# Patient Record
Sex: FEMALE | Race: BLACK OR AFRICAN AMERICAN | Employment: FULL TIME | URBAN - METROPOLITAN AREA
[De-identification: names, ages, dates, MRNs, and addresses within clinical notes are randomized per-mention and may not be internally consistent; named-entity substitution may affect disease eponyms.]

---

## 2017-04-08 ENCOUNTER — HOSPITAL ENCOUNTER (EMERGENCY)
Age: 22
Discharge: HOME | End: 2017-04-08
Attending: EMERGENCY MEDICINE | Admitting: EMERGENCY MEDICINE
Payer: MEDICAID

## 2017-04-08 VITALS
BODY MASS INDEX: 30.22 KG/M2 | SYSTOLIC BLOOD PRESSURE: 113 MMHG | RESPIRATION RATE: 16 BRPM | OXYGEN SATURATION: 100 % | DIASTOLIC BLOOD PRESSURE: 74 MMHG | WEIGHT: 177 LBS | TEMPERATURE: 97.6 F | HEART RATE: 89 BPM | HEIGHT: 64 IN

## 2017-04-08 DIAGNOSIS — N30.00 ACUTE CYSTITIS WITHOUT HEMATURIA: ICD-10-CM

## 2017-04-08 DIAGNOSIS — R10.2 PELVIC PAIN AFFECTING PREGNANCY IN FIRST TRIMESTER, ANTEPARTUM: Primary | ICD-10-CM

## 2017-04-08 DIAGNOSIS — O26.891 PELVIC PAIN AFFECTING PREGNANCY IN FIRST TRIMESTER, ANTEPARTUM: Primary | ICD-10-CM

## 2017-04-08 LAB
APPEARANCE UR: ABNORMAL
BACTERIA URNS QL MICRO: ABNORMAL /HPF
BILIRUB UR QL: NEGATIVE
COLOR UR: YELLOW
EPITH CASTS URNS QL MICRO: ABNORMAL /LPF (ref 0–5)
GLUCOSE UR STRIP.AUTO-MCNC: NEGATIVE MG/DL
HGB UR QL STRIP: NEGATIVE
KETONES UR QL STRIP.AUTO: NEGATIVE MG/DL
LEUKOCYTE ESTERASE UR QL STRIP.AUTO: ABNORMAL
NITRITE UR QL STRIP.AUTO: NEGATIVE
PH UR STRIP: 6 [PH] (ref 5–8)
PROT UR STRIP-MCNC: NEGATIVE MG/DL
RBC #/AREA URNS HPF: ABNORMAL /HPF (ref 0–5)
SP GR UR REFRACTOMETRY: >1.03 (ref 1–1.03)
UROBILINOGEN UR QL STRIP.AUTO: 1 EU/DL (ref 0.2–1)
WBC URNS QL MICRO: ABNORMAL /HPF (ref 0–5)

## 2017-04-08 PROCEDURE — 99283 EMERGENCY DEPT VISIT LOW MDM: CPT

## 2017-04-08 PROCEDURE — 81001 URINALYSIS AUTO W/SCOPE: CPT | Performed by: EMERGENCY MEDICINE

## 2017-04-08 RX ORDER — CEPHALEXIN 500 MG/1
500 CAPSULE ORAL 2 TIMES DAILY
Qty: 14 CAP | Refills: 0 | Status: SHIPPED | OUTPATIENT
Start: 2017-04-08 | End: 2017-04-15

## 2017-04-08 NOTE — DISCHARGE INSTRUCTIONS
Urinary Tract Infection in Women: Care Instructions  Your Care Instructions    A urinary tract infection, or UTI, is a general term for an infection anywhere between the kidneys and the urethra (where urine comes out). Most UTIs are bladder infections. They often cause pain or burning when you urinate. UTIs are caused by bacteria and can be cured with antibiotics. Be sure to complete your treatment so that the infection goes away. Follow-up care is a key part of your treatment and safety. Be sure to make and go to all appointments, and call your doctor if you are having problems. It's also a good idea to know your test results and keep a list of the medicines you take. How can you care for yourself at home? · Take your antibiotics as directed. Do not stop taking them just because you feel better. You need to take the full course of antibiotics. · Drink extra water and other fluids for the next day or two. This may help wash out the bacteria that are causing the infection. (If you have kidney, heart, or liver disease and have to limit fluids, talk with your doctor before you increase your fluid intake.)  · Avoid drinks that are carbonated or have caffeine. They can irritate the bladder. · Urinate often. Try to empty your bladder each time. · To relieve pain, take a hot bath or lay a heating pad set on low over your lower belly or genital area. Never go to sleep with a heating pad in place. To prevent UTIs  · Drink plenty of water each day. This helps you urinate often, which clears bacteria from your system. (If you have kidney, heart, or liver disease and have to limit fluids, talk with your doctor before you increase your fluid intake.)  · Urinate when you need to. · Urinate right after you have sex. · Change sanitary pads often. · Avoid douches, bubble baths, feminine hygiene sprays, and other feminine hygiene products that have deodorants.   · After going to the bathroom, wipe from front to back.  When should you call for help? Call your doctor now or seek immediate medical care if:  · Symptoms such as fever, chills, nausea, or vomiting get worse or appear for the first time. · You have new pain in your back just below your rib cage. This is called flank pain. · There is new blood or pus in your urine. · You have any problems with your antibiotic medicine. Watch closely for changes in your health, and be sure to contact your doctor if:  · You are not getting better after taking an antibiotic for 2 days. · Your symptoms go away but then come back. Where can you learn more? Go to http://daquan-katia.info/. Enter M054 in the search box to learn more about \"Urinary Tract Infection in Women: Care Instructions. \"  Current as of: November 28, 2016  Content Version: 11.2  © 7338-0991 [employer]. Care instructions adapted under license by [employer] (which disclaims liability or warranty for this information). If you have questions about a medical condition or this instruction, always ask your healthcare professional. [place] [address] any warranty or liability for your use of this information. Learning About Pregnancy  Your Care Instructions  Your health in the early weeks of your pregnancy is particularly important for your babys health. Take good care of yourself. Anything you do that harms your body can also harm your baby. Make sure to go to all of your doctor appointments. Regular checkups will help keep you and your baby healthy. Follow-up care is a key part of your treatment and safety. Be sure to make and go to all appointments, and call your doctor if you are having problems. Its also a good idea to know your test results and keep a list of the medicines you take. How can you care for yourself at home? Diet  · Eat a balanced diet. Make sure your diet includes plenty of beans, peas, and leafy green vegetables.   · Do not skip meals or go for many hours without eating. If you are nauseated, try to eat a small, healthy snack every 2 to 3 hours. · Do not eat fish that has a high level of mercury, such as shark, swordfish, or mackerel. Do not eat more than one can of tuna each week. · Drink plenty of fluids, enough so that your urine is light yellow or clear like water. If you have kidney, heart, or liver disease and have to limit fluids, talk with your doctor before you increase the amount of fluids you drink. · Cut down on caffeine, such as coffee, tea, and cola. · Do not drink alcohol, such as beer, wine, or hard liquor. · Take a multivitamin that contains at least 400 micrograms (mcg) of folic acid to help prevent birth defects. Fortified cereal and whole wheat bread are good additional sources of folic acid. · Increase the calcium in your diet. Try to drink a quart of skim milk each day. You may also take calcium supplements and choose foods such as cheese and yogurt. Lifestyle  · Make sure you go to your follow-up appointments. · Get plenty of rest. You may be unusually tired while you are pregnant. · Get at least 30 minutes of exercise on most days of the week. Walking is a good choice. If you have not exercised in the past, start out slowly. Take several short walks each day. · Do not smoke. If you need help quitting, talk to your doctor about stop-smoking programs. These can increase your chances of quitting for good. · Do not touch cat feces or litter boxes. Also, wash your hands after you handle raw meat, and fully cook all meat before you eat it. Wear gloves when you work in the yard or garden, and wash your hands well when you are done. Cat feces, raw or undercooked meat, and contaminated dirt can cause an infection that may harm your baby or lead to a miscarriage. · Do not use saunas or hot tubs. Raising your body temperature may harm your baby. · Avoid chemical fumes, paint fumes, or poisons.   · Do not use illegal drugs or alcohol. Medicines  · Review all of your medicines with your doctor. Some of your routine medicines may need to be changed to protect your baby. · Use acetaminophen (Tylenol) to relieve minor problems, such as a mild headache or backache or a mild fever with cold symptoms. Do not use nonsteroidal anti-inflammatory drugs (NSAIDs), such as ibuprofen (Advil, Motrin) or naproxen (Aleve), unless your doctor says it is okay. · Do not take two or more pain medicines at the same time unless the doctor told you to. Many pain medicines have acetaminophen, which is Tylenol. Too much acetaminophen (Tylenol) can be harmful. · Take your medicines exactly as prescribed. Call your doctor if you think you are having a problem with your medicine. To manage morning sickness  · If you feel sick when you first wake up, try eating a small snack (such as crackers) before you get out of bed. Allow some time to digest the snack, and then get out of bed slowly. · Do not skip meals or go for long periods without eating. An empty stomach can make nausea worse. · Eat small, frequent meals instead of three large meals each day. · Drink plenty of fluids. Sports drinks, such as Gatorade or Powerade, are good choices. · Eat foods that are high in protein but low in fat. · If you are taking iron supplements, ask your doctor if they are necessary. Iron can make nausea worse. · Avoid any smells, such as coffee, that make you feel sick. · Get lots of rest. Morning sickness may be worse when you are tired. Where can you learn more? Go to http://daquan-katia.info/. Enter E074 in the search box to learn more about \"Learning About Pregnancy. \"  Current as of: May 30, 2016  Content Version: 11.2  © 0852-9101 [employer]. Care instructions adapted under license by [employer] (which disclaims liability or warranty for this information).  If you have questions about a medical condition or this instruction, always ask your healthcare professional. [place] [address] any warranty or liability for your use of this information. Pelvic Pain: Care Instructions  Your Care Instructions    Pelvic pain, or pain in the lower belly, can have many causes. Often pelvic pain is not serious and gets better in a few days. If your pain continues or gets worse, you may need tests and treatment. Tell your doctor about any new symptoms. These may be signs of a serious problem. Follow-up care is a key part of your treatment and safety. Be sure to make and go to all appointments, and call your doctor if you are having problems. It's also a good idea to know your test results and keep a list of the medicines you take. How can you care for yourself at home? · Rest until you feel better. Lie down, and raise your legs by placing a pillow under your knees. · Drink plenty of fluids. You may find that small, frequent sips are easier on your stomach than if you drink a lot at once. Avoid drinks with carbonation or caffeine, such as soda pop, tea, or coffee. · Try eating several small meals instead of 2 or 3 large ones. Eat mild foods, such as rice, dry toast or crackers, bananas, and applesauce. Avoid fatty and spicy foods, other fruits, and alcohol until 48 hours after your symptoms have gone away. · Take an over-the-counter pain medicine, such as acetaminophen (Tylenol), ibuprofen (Advil, Motrin), or naproxen (Aleve). Read and follow all instructions on the label. · Do not take two or more pain medicines at the same time unless the doctor told you to. Many pain medicines have acetaminophen, which is Tylenol. Too much acetaminophen (Tylenol) can be harmful. · You can put a heating pad, a warm cloth, or moist heat on your belly to relieve pain. When should you call for help? Call 911 anytime you think you may need emergency care. For example, call if:  · You passed out (lost consciousness).   Call your doctor now or seek immediate medical care if:  · Your pain is getting worse. · Your pain becomes focused in one area of your belly. · You have severe vaginal bleeding. Severe means that you are soaking through your usual pads or tampons every hour for 2 or more hours and passing clots of blood. · You have new symptoms such as fever, urinary problems or unexpected vaginal bleeding. · You are dizzy or lightheaded, or you feel like you may faint. Watch closely for changes in your health, and be sure to contact your doctor if:  · You do not get better as expected. Where can you learn more? Go to http://daquan-katia.info/. Enter [phone] in the search box to learn more about \"Pelvic Pain: Care Instructions. \"  Current as of: October 13, 2016  Content Version: 11.2  © 1767-5207 [employer]. Care instructions adapted under license by [employer] (which disclaims liability or warranty for this information). If you have questions about a medical condition or this instruction, always ask your healthcare professional. [address] any warranty or liability for your use of this information.

## 2017-04-08 NOTE — ED PROVIDER NOTES
HPI Comments:   9:27 AM    Lucrecia Danielson is a 24 y.o. A0 Female, approximately 10 weeks pregnant, presenting to the ED C/O lower abdominal pain, off and on x several weeks. Pt reports the pain is typically associated with passing gas. OB/GYN is Drea Gayle, first appointment with whom is 2017 (6 days from now). States she is here today mainly to ensure her Robbert Specking is okay. \" No complications with first pregnancy. Pt denies vaginal bleeding, vaginal discharge, dysuria, and any other Sx or complaints. Patient is a 24 y.o. female presenting with abdominal pain. The history is provided by the patient. No  was used. Abdominal Pain    This is a new problem. The current episode started more than 1 week ago. Pain location: lower. Pertinent negatives include no fever, no diarrhea, no nausea, no vomiting, no constipation, no dysuria, no frequency, no headaches, no chest pain and no back pain. No past medical history on file. No past surgical history on file. No family history on file. Social History     Social History    Marital status: SINGLE     Spouse name: N/A    Number of children: N/A    Years of education: N/A     Occupational History    Not on file. Social History Main Topics    Smoking status: Never Smoker    Smokeless tobacco: Never Used    Alcohol use No    Drug use: No    Sexual activity: Not on file     Other Topics Concern    Not on file     Social History Narrative         ALLERGIES: Review of patient's allergies indicates no known allergies. Review of Systems   Constitutional: Negative for chills and fever. HENT: Negative for congestion and sore throat. Respiratory: Negative for cough and shortness of breath. Cardiovascular: Negative for chest pain. Gastrointestinal: Positive for abdominal pain (lower). Negative for constipation, diarrhea, nausea and vomiting.    Genitourinary: Negative for difficulty urinating, dysuria, flank pain, frequency, urgency, vaginal bleeding and vaginal discharge. Musculoskeletal: Negative for back pain. Skin: Negative for rash and wound. Neurological: Negative for dizziness and headaches. Vitals:    04/08/17 0728 04/08/17 1027   BP: 123/76 113/74   Pulse: 99 89   Resp: 16 16   Temp: 97.6 °F (36.4 °C)    SpO2: 100% 100%   Weight: 80.3 kg (177 lb)    Height: 5' 4\" (1.626 m)             Physical Exam   Constitutional: She is oriented to person, place, and time. She appears well-developed and well-nourished. No distress. AA female in NAD. Alert. Appears comfortable. SO at bedside. HENT:   Head: Normocephalic and atraumatic. Right Ear: External ear normal.   Left Ear: External ear normal.   Eyes: Conjunctivae are normal.   Neck: Normal range of motion. Cardiovascular: Normal rate, regular rhythm, normal heart sounds and intact distal pulses. Exam reveals no gallop and no friction rub. No murmur heard. Pulmonary/Chest: Effort normal and breath sounds normal. No accessory muscle usage. No tachypnea. No respiratory distress. She has no decreased breath sounds. She has no wheezes. She has no rhonchi. She has no rales. Abdominal: Soft. Normal appearance and bowel sounds are normal. She exhibits no mass. There is no tenderness. There is no rigidity, no rebound, no guarding, no CVA tenderness, no tenderness at McBurney's point and negative Lawrence's sign. Musculoskeletal: Normal range of motion. Neurological: She is alert and oriented to person, place, and time. Skin: Skin is warm and dry. No rash noted. She is not diaphoretic. No erythema. Psychiatric: She has a normal mood and affect. Judgment normal.   Nursing note and vitals reviewed.      RESULTS:    No orders to display        Labs Reviewed   URINALYSIS W/ RFLX MICROSCOPIC - Abnormal; Notable for the following:        Result Value    Specific gravity >1.030 (*)     Leukocyte Esterase MODERATE (*)     All other components within normal limits   URINE MICROSCOPIC ONLY - Abnormal; Notable for the following:     Bacteria FEW (*)     All other components within normal limits       No results found for this or any previous visit (from the past 12 hour(s)). MDM  Number of Diagnoses or Management Options  Acute cystitis without hematuria:   Pelvic pain affecting pregnancy in first trimester, antepartum:   Diagnosis management comments: Ectopic, miscarriage, UTI       Amount and/or Complexity of Data Reviewed  Clinical lab tests: ordered and reviewed      ED Course     MEDICATIONS GIVEN:  Medications - No data to display     Bedside US  Date/Time: 4/9/2017 11:19 AM  Consent: Verbal consent obtained. Written consent not obtained. Consent given by: patient  Supervising provider: Dr. Justin De Santiago  Procedure Type: Pelvic ultrasound (transabdominal)  Indication: pregnancy  Findings: Positive IUP with fetal heart rate (144)  Fetal Heart Rate: 144 bpm  Confirmation Study: I have advised the patient to obtain a confirmatory study as an outpatient. (has appt in 6 days to see OB in office for [address] [facility] [facility])          PROGRESS NOTE:   9:27 AM  Initial assessment performed. Written by Ivette Bradley, ED Scribe, as dictated by Fanny Palumbo PA-C    Pt refusing formal US by tech or labs. States needs to leave to take mother to work. No vaginal bleeding. Benign abdomen. Performed bedside US for reassurance. IUP seen on bedside US with . No vaginal bleeding. Will tx for UTI. FU with OB as scheduled this week. Reasons to RTED discussed with pt. All questions answered. Pt feels comfortable going home at this time. Pt expressed understanding and she agrees with plan. DISCHARGE NOTE:  10:13 AM   Doreen Kapadia  results have been reviewed with her. She has been counseled regarding her diagnosis, treatment, and plan.   She verbally conveys understanding and agreement of the signs, symptoms, diagnosis, treatment and prognosis and additionally agrees to follow up as discussed. She also agrees with the care-plan and conveys that all of her questions have been answered. I have also provided discharge instructions for her that include: educational information regarding their diagnosis and treatment, and list of reasons why they would want to return to the ED prior to their follow-up appointment, should her condition change. The patient and/or family have been provided with education for proper Emergency Department utilization. CLINICAL IMPRESSION:    1. Pelvic pain affecting pregnancy in first trimester, antepartum    2. Acute cystitis without hematuria        AFTER VISIT PLAN:    Discharge Medication List as of 4/8/2017 10:15 AM      START taking these medications    Details   cephALEXin (KEFLEX) 500 mg capsule Take 1 Cap by mouth two (2) times a day for 7 days. Indications: E. COLI URINARY TRACT INFECTION, Print, Disp-14 Cap, R-0         CONTINUE these medications which have NOT CHANGED    Details   prenatal multivit-ca-min-fe-fa tab Take 1 Tab by mouth daily. Indications: PREGNANCY, Historical Med              Follow-up Information     Follow up With Details Comments Contact Info    Luis Enrique Egan MD  Follow up with your scheduled OB/GYN appointment next week [address]      [facility] EM[facility]  As needed, If symptoms worsen [address] [place] [id number]  [phone]             Attestations: This note is prepared by Radha Powers, acting as Scribe for Jethro Paget, PA-C. Jethro Paget, PA-C: The scribe's documentation has been prepared under my direction and personally reviewed by me in its entirety. I confirm that the note above accurately reflects all work, treatment, procedures, and medical decision making performed by me.

## 2017-04-08 NOTE — ED TRIAGE NOTES
C/o abd pain for a couple of weeks, denies pain at this time. States she is 10 weeks preg and has first OB appointment next week, states she just wants to make sure her baby is ok. Denies urinary sx's, vaginal bleeding or discharge.

## 2017-04-08 NOTE — ED NOTES
Care assumed for discharge only. Pt denies complaints on discharge, no distress noted, Patient armband removed and shredded. Pt given script x1 with discharge instructions and verbalizes understanding. Pt discharged home ambulatory with family.

## 2017-04-08 NOTE — ED NOTES
Pt reports \"I just am here to get my baby checked out, my first ob appointment isn't until Friday\"

## 2017-11-02 ENCOUNTER — HOSPITAL ENCOUNTER (INPATIENT)
Age: 22
LOS: 1 days | Discharge: HOME | DRG: 560 | End: 2017-11-04
Attending: OBSTETRICS & GYNECOLOGY | Admitting: OBSTETRICS & GYNECOLOGY
Payer: MEDICAID

## 2017-11-02 LAB
ABO + RH BLD: NORMAL
APPEARANCE UR: CLEAR
BASOPHILS # BLD: 0 K/UL (ref 0–0.06)
BASOPHILS NFR BLD: 0 % (ref 0–2)
BILIRUB UR QL: NEGATIVE
BLOOD GROUP ANTIBODIES SERPL: NORMAL
COLOR UR: YELLOW
DIFFERENTIAL METHOD BLD: ABNORMAL
EOSINOPHIL # BLD: 0 K/UL (ref 0–0.4)
EOSINOPHIL NFR BLD: 0 % (ref 0–5)
ERYTHROCYTE [DISTWIDTH] IN BLOOD BY AUTOMATED COUNT: 14 % (ref 11.6–14.5)
GLUCOSE UR QL STRIP.AUTO: NEGATIVE MG/DL
HCT VFR BLD AUTO: 34.2 % (ref 35–45)
HGB BLD-MCNC: 11.7 G/DL (ref 12–16)
KETONES UR-MCNC: ABNORMAL MG/DL
LEUKOCYTE ESTERASE UR QL STRIP: ABNORMAL
LYMPHOCYTES # BLD: 1 K/UL (ref 0.9–3.6)
LYMPHOCYTES NFR BLD: 7 % (ref 21–52)
MCH RBC QN AUTO: 29.9 PG (ref 24–34)
MCHC RBC AUTO-ENTMCNC: 34.2 G/DL (ref 31–37)
MCV RBC AUTO: 87.5 FL (ref 74–97)
MONOCYTES # BLD: 0.5 K/UL (ref 0.05–1.2)
MONOCYTES NFR BLD: 4 % (ref 3–10)
NEUTS SEG # BLD: 11.6 K/UL (ref 1.8–8)
NEUTS SEG NFR BLD: 89 % (ref 40–73)
NITRITE UR QL: NEGATIVE
PH UR: 7 [PH] (ref 5–9)
PLATELET # BLD AUTO: 201 K/UL (ref 135–420)
PMV BLD AUTO: 11.3 FL (ref 9.2–11.8)
PROT UR QL: NEGATIVE MG/DL
RBC # BLD AUTO: 3.91 M/UL (ref 4.2–5.3)
RBC # UR STRIP: ABNORMAL /UL
SERVICE CMNT-IMP: ABNORMAL
SP GR UR: 1.02 (ref 1–1.02)
SPECIMEN EXP DATE BLD: NORMAL
UROBILINOGEN UR QL: 0.2 EU/DL (ref 0.2–1)
WBC # BLD AUTO: 13 K/UL (ref 4.6–13.2)

## 2017-11-02 PROCEDURE — 85025 COMPLETE CBC W/AUTO DIFF WBC: CPT | Performed by: NURSE PRACTITIONER

## 2017-11-02 PROCEDURE — 75410000002 HC LABOR FEE PER 1 HR

## 2017-11-02 PROCEDURE — 36415 COLL VENOUS BLD VENIPUNCTURE: CPT | Performed by: NURSE PRACTITIONER

## 2017-11-02 PROCEDURE — 10907ZC DRAINAGE OF AMNIOTIC FLUID, THERAPEUTIC FROM PRODUCTS OF CONCEPTION, VIA NATURAL OR ARTIFICIAL OPENING: ICD-10-PCS | Performed by: OBSTETRICS & GYNECOLOGY

## 2017-11-02 PROCEDURE — 59025 FETAL NON-STRESS TEST: CPT

## 2017-11-02 PROCEDURE — 74011250636 HC RX REV CODE- 250/636

## 2017-11-02 PROCEDURE — 74011250637 HC RX REV CODE- 250/637: Performed by: NURSE PRACTITIONER

## 2017-11-02 PROCEDURE — 99282 EMERGENCY DEPT VISIT SF MDM: CPT

## 2017-11-02 PROCEDURE — 74011250636 HC RX REV CODE- 250/636: Performed by: NURSE PRACTITIONER

## 2017-11-02 PROCEDURE — 88307 TISSUE EXAM BY PATHOLOGIST: CPT | Performed by: OBSTETRICS & GYNECOLOGY

## 2017-11-02 PROCEDURE — 81003 URINALYSIS AUTO W/O SCOPE: CPT

## 2017-11-02 PROCEDURE — 74011000250 HC RX REV CODE- 250

## 2017-11-02 PROCEDURE — 75410000003 HC RECOV DEL/VAG/CSECN EA 0.5 HR

## 2017-11-02 PROCEDURE — 75410000000 HC DELIVERY VAGINAL/SINGLE

## 2017-11-02 PROCEDURE — 86900 BLOOD TYPING SEROLOGIC ABO: CPT | Performed by: NURSE PRACTITIONER

## 2017-11-02 RX ORDER — NALBUPHINE HYDROCHLORIDE 10 MG/ML
10 INJECTION, SOLUTION INTRAMUSCULAR; INTRAVENOUS; SUBCUTANEOUS
Status: DISCONTINUED | OUTPATIENT
Start: 2017-11-02 | End: 2017-11-03 | Stop reason: HOSPADM

## 2017-11-02 RX ORDER — TERBUTALINE SULFATE 1 MG/ML
0.25 INJECTION SUBCUTANEOUS
Status: DISCONTINUED | OUTPATIENT
Start: 2017-11-02 | End: 2017-11-03 | Stop reason: HOSPADM

## 2017-11-02 RX ORDER — FENTANYL/ROPIVACAINE/NS/PF 2MCG/ML-.1
PLASTIC BAG, INJECTION (ML) EPIDURAL
Status: DISPENSED
Start: 2017-11-02 | End: 2017-11-03

## 2017-11-02 RX ORDER — LIDOCAINE HYDROCHLORIDE 10 MG/ML
INJECTION INFILTRATION; PERINEURAL
Status: COMPLETED
Start: 2017-11-02 | End: 2017-11-02

## 2017-11-02 RX ORDER — HYDROMORPHONE HYDROCHLORIDE 2 MG/ML
1 INJECTION, SOLUTION INTRAMUSCULAR; INTRAVENOUS; SUBCUTANEOUS
Status: DISCONTINUED | OUTPATIENT
Start: 2017-11-02 | End: 2017-11-03 | Stop reason: HOSPADM

## 2017-11-02 RX ORDER — SODIUM CHLORIDE, SODIUM LACTATE, POTASSIUM CHLORIDE, CALCIUM CHLORIDE 600; 310; 30; 20 MG/100ML; MG/100ML; MG/100ML; MG/100ML
125 INJECTION, SOLUTION INTRAVENOUS CONTINUOUS
Status: DISCONTINUED | OUTPATIENT
Start: 2017-11-02 | End: 2017-11-03 | Stop reason: HOSPADM

## 2017-11-02 RX ORDER — METHYLERGONOVINE MALEATE 0.2 MG/ML
0.2 INJECTION INTRAVENOUS AS NEEDED
Status: COMPLETED | OUTPATIENT
Start: 2017-11-02 | End: 2017-11-02

## 2017-11-02 RX ORDER — LIDOCAINE HYDROCHLORIDE 10 MG/ML
20 INJECTION, SOLUTION EPIDURAL; INFILTRATION; INTRACAUDAL; PERINEURAL AS NEEDED
Status: DISCONTINUED | OUTPATIENT
Start: 2017-11-02 | End: 2017-11-03 | Stop reason: HOSPADM

## 2017-11-02 RX ORDER — OXYTOCIN/RINGER'S LACTATE 20/1000 ML
125 PLASTIC BAG, INJECTION (ML) INTRAVENOUS CONTINUOUS
Status: DISCONTINUED | OUTPATIENT
Start: 2017-11-02 | End: 2017-11-03 | Stop reason: HOSPADM

## 2017-11-02 RX ORDER — OXYTOCIN/RINGER'S LACTATE 20/1000 ML
500 PLASTIC BAG, INJECTION (ML) INTRAVENOUS ONCE
Status: DISCONTINUED | OUTPATIENT
Start: 2017-11-02 | End: 2017-11-03 | Stop reason: HOSPADM

## 2017-11-02 RX ORDER — BUTORPHANOL TARTRATE 2 MG/ML
2 INJECTION INTRAMUSCULAR; INTRAVENOUS
Status: DISCONTINUED | OUTPATIENT
Start: 2017-11-02 | End: 2017-11-03 | Stop reason: HOSPADM

## 2017-11-02 RX ORDER — FENTANYL CITRATE 50 UG/ML
INJECTION, SOLUTION INTRAMUSCULAR; INTRAVENOUS
Status: DISPENSED
Start: 2017-11-02 | End: 2017-11-03

## 2017-11-02 RX ORDER — MINERAL OIL
30 OIL (ML) ORAL AS NEEDED
Status: COMPLETED | OUTPATIENT
Start: 2017-11-02 | End: 2017-11-02

## 2017-11-02 RX ADMIN — LIDOCAINE HYDROCHLORIDE: 10 INJECTION, SOLUTION INFILTRATION; PERINEURAL at 19:40

## 2017-11-02 RX ADMIN — Medication 125 ML/HR: at 19:43

## 2017-11-02 RX ADMIN — METHYLERGONOVINE MALEATE 0.2 MG: 0.2 INJECTION, SOLUTION INTRAMUSCULAR; INTRAVENOUS at 20:19

## 2017-11-02 RX ADMIN — BUTORPHANOL TARTRATE 2 MG: 2 INJECTION, SOLUTION INTRAMUSCULAR; INTRAVENOUS at 17:30

## 2017-11-02 RX ADMIN — Medication 30 ML: at 19:35

## 2017-11-02 RX ADMIN — SODIUM CHLORIDE, SODIUM LACTATE, POTASSIUM CHLORIDE, AND CALCIUM CHLORIDE 125 ML/HR: 600; 310; 30; 20 INJECTION, SOLUTION INTRAVENOUS at 17:20

## 2017-11-02 NOTE — PROGRESS NOTES
1300  at 39 weeks comes to unit via wheelchair with complaints of occasional contractions pain at 6/10. States she lost her mucus plug around 1100 today. 1312 Pt placed on monitor. 1401 GUIDO Zuñiga paged. 1420 Assessment and urine dip results reviewed with Teja Yi CNM. Orders received to admit patient for delivery.

## 2017-11-02 NOTE — PROGRESS NOTES
-Bedside and verbal shift change report received from Mariza Champagne RN. Pt  at 39w0d gestation. Pt denies headache, SOB, epigastric pain, N/V, and visual disturbances. Pt reports pain 9/10 with contractions. Request epidural at this time. -[facility]ett for epidural placement    -Dr returned page, on his way to unit    -Pt states she feels urge to push. CNM in room, SVE 10/+2    -Pt starts pushing   -  -placenta delivered  -0.2mg IM methergine given as ordered per MAR    2136-Ambulated to restroom. Erma care, gown and pad changed. Pt returned to bed.  Call light in reach    45-Bedside and verbal report given to MOLLY CHRISTIANSON.

## 2017-11-02 NOTE — H&P
History & Physical    Name: Joby Jeffers MRN: [id number]  SSN: [id number]    YOB: 1995  Age: 20 y.o. Sex: female        Subjective:     Estimated Date of Delivery: 17  OB History      Para Term  AB Living    2 1 1   1    SAB TAB Ectopic Molar Multiple Live Births        0 1            MsMartell Alfaro is admitted with pregnancy at 39w0d for active labor. Prenatal course was complicated by Pos chlamydia and oral HSV. Please see prenatal records for details. No Known Allergies    Objective:     Vitals:  Vitals:    17 1315 17 1318 17 1320   BP: 134/81  134/81   Pulse: (!) 115  (!) 115   Resp: 16  16   Temp: 97.8 °F (36.6 °C)  97.8 °F (36.6 °C)   Weight:  86.6 kg (191 lb)    Height:  5' 4\" (1.626 m)         Physical Exam:  Patient without distress. Heart: Regular rate and rhythm  Lung: clear to auscultation throughout lung fields, no wheezes, no rales, no rhonchi and normal respiratory effort  Abdomen: soft, nontender  Fundus: soft and non tender  Perineum: blood absent, amniotic fluid absent  Cervical Exam: 6 cm dilated    90% effaced    -1 station    Lower Extremities: no evidence of DVT  Membranes:  Intact  Fetal Heart Rate & Contraction pattern: Reactive    Prenatal Labs:   Lab Results   Component Value Date/Time    Rubella, External Immune 10/23/2015    GrBStrep, External Negative 2015    HBsAg, External Negative 10/23/2015    HIV, External Negative 10/23/2015    RPR, External Non reactive 10/23/2015    Gonorrhea, External Negative 10/23/2015    Chlamydia, External Negative 10/23/2015         Assessment/Plan:     Plan: Admit for Reassuring fetal status, Continue plan for vaginal delivery. Group B Strep was negative.     Signed By:  Jason Covarrubias CNM     2017

## 2017-11-03 LAB
HCT VFR BLD AUTO: 33.6 % (ref 35–45)
HGB BLD-MCNC: 11.5 G/DL (ref 12–16)

## 2017-11-03 PROCEDURE — 65270000029 HC RM PRIVATE

## 2017-11-03 PROCEDURE — 74011250637 HC RX REV CODE- 250/637: Performed by: ADVANCED PRACTICE MIDWIFE

## 2017-11-03 PROCEDURE — 85014 HEMATOCRIT: CPT | Performed by: OBSTETRICS & GYNECOLOGY

## 2017-11-03 PROCEDURE — 85018 HEMOGLOBIN: CPT | Performed by: OBSTETRICS & GYNECOLOGY

## 2017-11-03 PROCEDURE — 36415 COLL VENOUS BLD VENIPUNCTURE: CPT | Performed by: OBSTETRICS & GYNECOLOGY

## 2017-11-03 RX ORDER — PROMETHAZINE HYDROCHLORIDE 25 MG/ML
25 INJECTION, SOLUTION INTRAMUSCULAR; INTRAVENOUS
Status: DISCONTINUED | OUTPATIENT
Start: 2017-11-03 | End: 2017-11-04 | Stop reason: HOSPADM

## 2017-11-03 RX ORDER — OXYCODONE AND ACETAMINOPHEN 5; 325 MG/1; MG/1
2 TABLET ORAL
Status: DISCONTINUED | OUTPATIENT
Start: 2017-11-03 | End: 2017-11-04 | Stop reason: HOSPADM

## 2017-11-03 RX ORDER — ZOLPIDEM TARTRATE 5 MG/1
5 TABLET ORAL
Status: DISCONTINUED | OUTPATIENT
Start: 2017-11-03 | End: 2017-11-04 | Stop reason: HOSPADM

## 2017-11-03 RX ORDER — ACETAMINOPHEN 325 MG/1
650 TABLET ORAL
Status: DISCONTINUED | OUTPATIENT
Start: 2017-11-03 | End: 2017-11-04 | Stop reason: HOSPADM

## 2017-11-03 RX ORDER — IBUPROFEN 800 MG/1
800 TABLET ORAL
Qty: 50 TAB | Refills: 0 | Status: SHIPPED | OUTPATIENT
Start: 2017-11-03

## 2017-11-03 RX ORDER — IBUPROFEN 400 MG/1
800 TABLET ORAL
Status: DISCONTINUED | OUTPATIENT
Start: 2017-11-03 | End: 2017-11-04 | Stop reason: HOSPADM

## 2017-11-03 RX ORDER — AMOXICILLIN 250 MG
1 CAPSULE ORAL
Status: DISCONTINUED | OUTPATIENT
Start: 2017-11-03 | End: 2017-11-04 | Stop reason: HOSPADM

## 2017-11-03 RX ADMIN — IBUPROFEN 800 MG: 400 TABLET, FILM COATED ORAL at 14:47

## 2017-11-03 NOTE — LACTATION NOTE
Mom just gave another bottle. Mom encouraged to decide what she really wants to be done and reiterated supply/demand and nipple preference.

## 2017-11-03 NOTE — PROGRESS NOTES
Bedside and Verbal shift change report given to SERENITY Hilliard RN (oncoming nurse) by SERENITY William RN (offgoing nurse). Report included the following information SBAR, Kardex, Intake/Output, MAR and Recent Results.

## 2017-11-03 NOTE — PROGRESS NOTES
INITIAL NUTRITION ASSESSMENT     REASON FOR ASSESSMENT:   [x]  RN Referral:    [x] MST score >/=2  Malnutrition Screening Tool (MST):  Recently Lost Weight Without Trying: No  If Yes, How Much Weight Loss: Unsure  Eating Poorly Due to Decreased Appetite: No  MST Score: 2    [] Enteral/Parenteral Nutrition PTA   [] Pregnant (Not in Labor):  [] Gestational DM     [x] Multigestation   [] Pressure Ulcer/Wound Care needs    NUTRITION ASSESSMENT:   Client History: 20 yrs old Female admitted with single pregnancy, s/p birth of infant. Nutrition best practice alert triggered in error. Please consult if dietitian services are indicated and desired.  Thank you.    [] Participated in Interdisciplinary Rounds  [x] [facility] Reviewed/Documented  [] Discharge Nutrition Plan     Baldev Mccarty RD  PAGER:  [phone]

## 2017-11-03 NOTE — PROGRESS NOTES
Bedside and Verbal shift change report given to SERENITY William RN (oncoming nurse) by SERENITY Curran RN (offgoing nurse). Report included the following information SBAR, Kardex and Recent Results.

## 2017-11-03 NOTE — PROGRESS NOTES
Progress Note    Patient: Anshu Saravia MRN: [id number]     YOB: 1995  Age: 20 y.o. Subjective:     Postpartum Day: 1    The patient is feeling well. Pain is  well controlled with current medications. Baby is feeding via breast without difficulty. Urinary output is adequate. Objective:      Patient Vitals for the past 8 hrs:   BP Temp Pulse Resp SpO2   11/03/17 0636 114/68 98.9 °F (37.2 °C) 91 18 96 %     General:    alert, cooperative, no distress   Lochia:  appropriate   Uterine Fundus:   firm @ 2 fingerbreaths below umbilicus    Perineum:  Intact    DVT Evaluation:  No evidence of DVT seen on physical exam.     Lab/Data Review:  Recent Results (from the past 24 hour(s))   POC URINE MACROSCOPIC    Collection Time: 11/02/17  1:42 PM   Result Value Ref Range    Color YELLOW      Appearance CLEAR      Spec. gravity (POC) 1.025 (H) 1.001 - 1.023      pH, urine  (POC) 7.0 5.0 - 9.0      Protein (POC) NEGATIVE  NEG mg/dL    Glucose, urine (POC) NEGATIVE  NEG mg/dL    Ketones (POC) TRACE (A) NEG mg/dL    Bilirubin (POC) NEGATIVE  NEG      Blood (POC) Trace Intact (A) NEG      Urobilinogen (POC) 0.2 0.2 - 1.0 EU/dL    Nitrite (POC) NEGATIVE  NEG      Leukocyte esterase (POC) TRACE (A) NEG      Performed by Lilliana Singh    CBC WITH AUTOMATED DIFF    Collection Time: 11/02/17  5:40 PM   Result Value Ref Range    WBC 13.0 4.6 - 13.2 K/uL    RBC 3.91 (L) 4.20 - 5.30 M/uL    HGB 11.7 (L) 12.0 - 16.0 g/dL    HCT 34.2 (L) 35.0 - 45.0 %    MCV 87.5 74.0 - 97.0 FL    MCH 29.9 24.0 - 34.0 PG    MCHC 34.2 31.0 - 37.0 g/dL    RDW 14.0 11.6 - 14.5 %    PLATELET 607 513 - 131 K/uL    MPV 11.3 9.2 - 11.8 FL    NEUTROPHILS 89 (H) 40 - 73 %    LYMPHOCYTES 7 (L) 21 - 52 %    MONOCYTES 4 3 - 10 %    EOSINOPHILS 0 0 - 5 %    BASOPHILS 0 0 - 2 %    ABS. NEUTROPHILS 11.6 (H) 1.8 - 8.0 K/UL    ABS. LYMPHOCYTES 1.0 0.9 - 3.6 K/UL    ABS. MONOCYTES 0.5 0.05 - 1.2 K/UL    ABS. EOSINOPHILS 0.0 0.0 - 0.4 K/UL    ABS. BASOPHILS 0.0 0.0 - 0.06 K/UL    DF AUTOMATED     TYPE & SCREEN    Collection Time: 17  5:40 PM   Result Value Ref Range    Crossmatch Expiration 2017     ABO/Rh(D) A POSITIVE     Antibody screen NEG    HEMATOCRIT    Collection Time: 17  2:59 AM   Result Value Ref Range    HCT 33.6 (L) 35.0 - 45.0 %   HEMOGLOBIN    Collection Time: 17  2:59 AM   Result Value Ref Range    HGB 11.5 (L) 12.0 - 16.0 g/dL     All lab results for the last 24 hours reviewed. Assessment:     Delivery:     Plan:     Doing well postpartum vaginal delivery. Continue current postpartum care. Encouraged hydration, nutrition and ambulation. DC home tomorrow. Follow-up in office in 6 weeks. Call prn. Current Discharge Medication List      CONTINUE these medications which have NOT CHANGED    Details   prenatal multivit-ca-min-fe-fa tab Take 1 Tab by mouth daily.  Indications: PREGNANCY             Signed By: Tera Benitez CNM     November 3, 2017

## 2017-11-03 NOTE — L&D DELIVERY NOTE
Delivery Procedure Note     Delivery Physician:  Nisa Mitchell CNM    Procedure: Spontaneous vaginal delivery    Monitor:  Fetal Heart Tones - External and Uterine Contractions - External    Indications for instrumental delivery: none    Estimated Blood Loss: 100 ml    Episiotomy: n/a    Laceration(s):  1st degree vaginal    Laceration(s) repair: NO    Presentation: Cephalic    Fetal Description: marte    Fetal Position: Left Occiput Anterior    [place] Interventions: Nasopharyngeal/Oropharyngeal Suctioning, Warming/Drying and Tactile stimulation    Birth Weight: 3711gm    Birth Length: 48.3 cm    Apgar - One Minute: 9    Apgar - Five Minutes: 9    Umbilical Cord: 3 vessels present    Placenta:  Date 17 Time:     Placenta Removal: spontaneous    Placenta Appearance: normal intact    Specimens: Cord blood for DNA           Complications:  none           Midwife called into room to assess patient. Pt pushing with good maternal effort. Spontaneous vaginal delivery of viable infant female delivered without difficulty. Infant with spontaneous cry,  tactile stimulation and bulb oral suction given.  placed on maternal abdomen for skin to skin contact. Delayed cord x 2 minutes. Cord clamped x 2 by POLINA and cut by FOB. Infant to nursery nurse, refer to nursery notes for NB assessment. Placenta delivered spontaneously,Radha,3VC with central insertion and hematomas noted near cord insertion site. Placenta sent to pathology. Pitocin IV bolus given. Fundus slow to firm with massage, Methergine 0.2mg IM x 1 given with good result. Fundus firm to massage with EBL = 100 ml. Perineal inspection revealed one small 1st degree vaginal tear with good hemostasis after pressure applied and in no need of repair. Mom and infant stable. Sharps/Laps/Instrument count correct at procedure end.     Signed By:  Nisa Mitchell CNM     2017

## 2017-11-03 NOTE — ROUTINE PROCESS
Bedside and Verbal shift change report given to SERENITY William RN (oncoming nurse) by Deborah RN (offgoing nurse). Report included the following information SBAR, Kardex, Intake/Output and MAR.

## 2017-11-03 NOTE — LACTATION NOTE
Mom with stated feeding plan of breast--has done half bottles and half breast.  Reviewed supply/demand and nipple preference and paced bottle feeding.   To page LC when BF.

## 2017-11-03 NOTE — ROUTINE PROCESS
TRANSFER - OUT REPORT:    Verbal report given to S.Rn(name) on Denise Gleason  being transferred to Mother/Baby (unit) for routine post - op       Report consisted of patients Situation, Background, Assessment and   Recommendations(SBAR). Information from the following report(s) SBAR was reviewed with the receiving nurse. Lines:   Peripheral IV 11/02/17 Left Forearm (Active)   Site Assessment Clean, dry, & intact 11/2/2017  5:02 PM   Phlebitis Assessment 0 11/2/2017  5:02 PM   Infiltration Assessment 0 11/2/2017  5:02 PM   Dressing Status Clean, dry, & intact 11/2/2017  5:02 PM   Dressing Type Transparent 11/2/2017  5:02 PM        Opportunity for questions and clarification was provided.       Patient transported with:   Registered Nurse

## 2017-11-03 NOTE — ROUTINE PROCESS
0050-TRANSFER - IN REPORT:    Verbal report received from ROXANNE Molina RN(name) on Torin Ashley  being received from L&D(unit) for routine progression of care      Report consisted of patients Situation, Background, Assessment and   Recommendations(SBAR). Information from the following report(s) SBAR, Kardex, Intake/Output and MAR was reviewed with the receiving nurse. Opportunity for questions and clarification was provided. Assessment completed upon patients arrival to unit and care assumed.

## 2017-11-04 VITALS
OXYGEN SATURATION: 99 % | DIASTOLIC BLOOD PRESSURE: 88 MMHG | HEIGHT: 64 IN | BODY MASS INDEX: 32.61 KG/M2 | WEIGHT: 191 LBS | SYSTOLIC BLOOD PRESSURE: 135 MMHG | TEMPERATURE: 98.5 F | HEART RATE: 92 BPM | RESPIRATION RATE: 18 BRPM

## 2017-11-04 NOTE — DISCHARGE INSTRUCTIONS
POST DELIVERY DISCHARGE INSTRUCTIONS    Name: Alex Metcalf  YOB: 1995  Primary Diagnosis: Active Problems:    Normal labor and delivery (11/3/2017)      Postpartum care and examination (11/3/2017)        General:     Diet/Diet Restrictions:  Eight 8-ounce glasses of fluid daily (water, juices); avoid excessive caffeine intake. Meals/snacks as desired which are high in fiber and carbohydrates and low in fat and cholesterol. Physical Activity / Restrictions / Safety:     Avoid heavy lifting, no more than the baby alone (not the baby in the car seat). Avoid intercourse until you are seen at your postpartum visit. No douching or tampon use. Check with obstetrician before starting or resuming an exercise program.         Discharge Instructions/Special Treatment/Home Care Needs:     Continue prenatal vitamins. Continue to use squirt bottle with warm water on your perineum after each bathroom use until bleeding stops. Call your doctor for the following:     Fever over 101 degrees by mouth. Vaginal bleeding that soaks two pads per hour for more than one hour. Red streaks or increased swelling of legs, painful red streaks on your breast.  If you feel extremely anxious or overwhelmed. If you have thoughts of harming yourself and/or your baby. Pain Management:     Pain Management:   Take Acetaminophen (Tylenol) or Ibuprofen (Advil, Motrin), as directed for pain. Use a warm Sitz bath 3 times daily to relieve perineal or hemorrhoidal discomfort. For hemorrhoidal discomfort, use Tucks and Anusol cream as needed and directed.     Follow-Up Care:     Appointment with MD:   Follow-up Appointments   Procedures    FOLLOW UP VISIT Appointment in: 6 Weeks     Standing Status:   Standing     Number of Occurrences:   1     Order Specific Question:   Appointment in     Answer:   6 Weeks     Telephone number: [phone]      Signed By: Justin Kramer CNM

## 2017-11-04 NOTE — LACTATION NOTE
Mom has been giving formula > 24 hours, but still wanted to see LC. Told LC the infant had just BF 20 in,  But infant sucking vigerousley on  Pacifier. When up to breast fussing, not latching. Suggested mom paced bottle feed 5-10 mls formula to calm and see if would latch. Mom encouraged to keep trying. She has no pump, had been advised to call insurance company. Manual pump given for prn ac use.

## 2017-11-04 NOTE — PROGRESS NOTES
Bedside and Verbal shift change report given to TRUE Boateng RN (oncoming nurse) by SERENITY William RN (offgoing nurse). Report included the following information SBAR, Kardex, Intake/Output, MAR and Recent Results.

## 2017-11-04 NOTE — PROGRESS NOTES
Post-Partum Day Number 2 Progress Note    Kyleigh Clear     Assessment:   Hospital Problems  Date Reviewed: 2016          Codes Class Noted POA    Normal labor and delivery ICD-10-CM: O80  ICD-9-CM: 988  11/3/2017 Unknown        Postpartum care and examination ICD-10-CM: Z39.2  ICD-9-CM: V24.2  11/3/2017 Unknown        Labor and delivery, indication for care ICD-10-CM: O75.9  ICD-9-CM: 659.90  11/3/2017 Unknown            Doing well, post partum day 2    Plan:   1. Discharge home today  2. Follow up in office in 6 weeks with Meriel Osgood, MD   3. Post partum activity advised, diet as tolerated  4. Discharge Medications: ibuprofen and medications prior to admission    Information for the patient's :  Laith Oquendo [[id number]]   Vaginal, Spontaneous Delivery   Patient doing well without significant complaint. Voiding without difficulty, normal lochia. Current Facility-Administered Medications   Medication Dose Route Frequency       Vitals:  Visit Vitals    /88 (BP 1 Location: Left arm, BP Patient Position: At rest)    Pulse 92    Temp 98.5 °F (36.9 °C)    Resp 18    Ht 5' 4\" (1.626 m)    Wt 86.6 kg (191 lb)    LMP 2017    SpO2 99%    Breastfeeding Yes    BMI 32.79 kg/m2     Temp (24hrs), Av.5 °F (36.9 °C), Min:98.4 °F (36.9 °C), Max:98.5 °F (36.9 °C)      Exam:         Patient without distress. Abdomen soft, fundus firm, nontender                 Lower extremities are negative for swelling, cords or tenderness.     Labs:     Lab Results   Component Value Date/Time    WBC 13.0 2017 05:40 PM    WBC 8.0 2016 09:05 AM    HGB 11.5 2017 02:59 AM    HGB 11.7 2017 05:40 PM    HGB 10.2 2016 03:55 AM    HCT 33.6 2017 02:59 AM    HCT 34.2 2017 05:40 PM    HCT 30.2 2016 03:55 AM    PLATELET 114  05:40 PM    PLATELET 799  09:05 AM       No results found for this or any previous visit (from the past 24 hour(s)).

## 2017-11-06 NOTE — ADT AUTH CERT NOTES
Patient Demographics        Patient Name [address] Sex  Address Phone     [place] Yvonnie Babinski [phone] Female 1995 [facility] MARZENA WALTER [address] [id number] [phone] (Mobile)           CSN:       [id number]           Admit Date: Admit Time Room Bed       2017  1:09  [[id number]] 01 [[id number]]           Attending Providers        Provider Pager From To       Mikayla Chang MD  17      Adm   Del  mom and baby dc     Delivery Date: 2017   Delivery Time: 7:36 PM   Delivery Type: Vaginal, Spontaneous Delivery  Sex:  female    Gestational Age: 36w0d      Measurements:  Birth Weight: 3.711 kg    Birth Length: 19\"          Delivery Clinician:     Living?:   Delivery Location: L&D

## 2019-08-22 LAB
CHLAMYDIA, EXTERNAL: NEGATIVE
HBSAG, EXTERNAL: NEGATIVE
HIV, EXTERNAL: NEGATIVE
N. GONORRHEA, EXTERNAL: NEGATIVE
RPR, EXTERNAL: NON REACTIVE
RUBELLA, EXTERNAL: NORMAL

## 2020-02-19 ENCOUNTER — HOSPITAL ENCOUNTER (OUTPATIENT)
Age: 25
Discharge: HOME | End: 2020-02-20
Attending: OBSTETRICS & GYNECOLOGY | Admitting: OBSTETRICS & GYNECOLOGY
Payer: MEDICAID

## 2020-02-19 PROBLEM — Z34.90 PREGNANCY: Status: ACTIVE | Noted: 2020-02-19

## 2020-02-19 LAB
APPEARANCE UR: ABNORMAL
BILIRUB UR QL: ABNORMAL
COLOR UR: ABNORMAL
GLUCOSE UR QL STRIP.AUTO: NEGATIVE MG/DL
KETONES UR-MCNC: ABNORMAL MG/DL
LEUKOCYTE ESTERASE UR QL STRIP: ABNORMAL
NITRITE UR QL: NEGATIVE
PH UR: 7 [PH] (ref 5–9)
PROT UR QL: 30 MG/DL
RBC # UR STRIP: ABNORMAL /UL
SERVICE CMNT-IMP: ABNORMAL
SP GR UR: 1.01 (ref 1–1.02)
UROBILINOGEN UR QL: 2 EU/DL (ref 0.2–1)

## 2020-02-19 PROCEDURE — 99282 EMERGENCY DEPT VISIT SF MDM: CPT

## 2020-02-19 PROCEDURE — 81003 URINALYSIS AUTO W/O SCOPE: CPT

## 2020-02-19 PROCEDURE — 59025 FETAL NON-STRESS TEST: CPT

## 2020-02-20 VITALS
HEIGHT: 64 IN | SYSTOLIC BLOOD PRESSURE: 107 MMHG | HEART RATE: 94 BPM | TEMPERATURE: 97.5 F | WEIGHT: 179 LBS | DIASTOLIC BLOOD PRESSURE: 64 MMHG | BODY MASS INDEX: 30.56 KG/M2 | RESPIRATION RATE: 18 BRPM

## 2020-02-20 PROCEDURE — 99282 EMERGENCY DEPT VISIT SF MDM: CPT

## 2020-02-20 PROCEDURE — 59025 FETAL NON-STRESS TEST: CPT

## 2020-02-20 NOTE — PROGRESS NOTES
Pt is a 21 y.o.  at 34w1d, arrived to L&D triage with c/o of occasional ctx and small amount of bleeding after intercourse at 1900, denies LOF and reports +FM. Rates the ctx/tightening 3/10, states not painful can just tell that they are present. abd is soft and nontender. Upon visual examination there is no bleeding present. EFM and TOCO applied, call bell within reach. Pt is tearful and states feeling\" depressed, overwhelmed and stressed\". Pt to request follow up appointment sooner than appointment already schedueld on  to address \"depression\" concerns. 0005: SBAR to [facility], orders received no SVE needed, IVF bolus and then d/c home    0011: pt decline IVF hyddration, requests PO hydration, CNM aware. Pt instructed to drink approx. 500mL qhr while awake    0019: I have reviewed discharge instructions with the patient. The patient verbalized understanding.     0025: pt ambulated off unit in stable condition

## 2020-02-20 NOTE — DISCHARGE INSTRUCTIONS
Patient Education   Patient Education   Patient Education   Patient Education     Vaginal Bleeding After the First Trimester of Pregnancy: After Your Visit  Your Care Instructions  Several things can cause bleeding in the second or third trimester of pregnancy. Some of them are serious, but others are not a cause for worry. In some cases, the cause of bleeding is not known. You do not have one of the serious conditions that can cause bleeding in pregnancy. Your baby is fine. However, you need to contact your doctor about any future bleeding, which could be a sign of  labor or other problems. Follow-up care is a key part of your treatment and safety. Be sure to make and go to all appointments, and call your doctor if you are having problems. Its also a good idea to know your test results and keep a list of the medicines you take. How can you care for yourself at home? · Do not have sex until your doctor says it is safe. · Follow your doctor's instructions about how much activity is safe for you. When should you call for help? Call 911 anytime you think you may need emergency care. For example, call if:  · You passed out (lost consciousness). · You have severe vaginal bleeding. · You have severe pain in your belly or pelvis. · You have had fluid gushing or leaking from your vagina and you know or think the umbilical cord is bulging into your vagina. If this happens, immediately get down on your knees so your rear end (buttocks) is higher than your head. This will decrease the pressure on the cord until help arrives. Call your doctor now or seek immediate medical care if:  · You have signs of preeclampsia, such as:  ¨ Sudden swelling of your face, hands, or feet. ¨ New vision problems (such as dimness or blurring). ¨ A severe headache. · You have any vaginal bleeding. · You have belly pain or cramping. · You have a fever. · You have had regular contractions (with or without pain) for an hour. This means that you have 8 or more within 1 hour or 4 or more in 20 minutes after you change your position and drink fluids. · You have a sudden release of fluid from the vagina. · You have low back pain or pelvic pressure that does not go away. · You notice that your baby has stopped moving or is moving much less than normal.  Watch closely for changes in your health, and be sure to contact your doctor if:  · You do not get better as expected. Where can you learn more? Go to Mohound.be  Enter I318 in the search box to learn more about \"Vaginal Bleeding After the First Trimester of Pregnancy: After Your Visit. \"   © 8516-0424 Healthwise, Incorporated. Care instructions adapted under license by [employer] (which disclaims liability or warranty for this information). This care instruction is for use with your licensed healthcare professional. If you have questions about a medical condition or this instruction, always ask your healthcare professional. [place] [address] any warranty or liability for your use of this information. Content Version: 9.4.74540; Last Revised: 2011                  Pregnancy Precautions: Care Instructions  Your Care Instructions    There is no sure way to prevent labor before your due date ( labor) or to prevent most other pregnancy problems. But there are things you can do to increase your chances of a healthy pregnancy. Go to your appointments, follow your doctor's advice, and take good care of yourself. Eat well, and exercise (if your doctor agrees). And make sure to drink plenty of water. Follow-up care is a key part of your treatment and safety. Be sure to make and go to all appointments, and call your doctor if you are having problems. It's also a good idea to know your test results and keep a list of the medicines you take. How can you care for yourself at home? · Make sure you go to your prenatal appointments.  At each visit, your doctor will check your blood pressure. Your doctor will also check to see if you have protein in your urine. High blood pressure and protein in urine are signs of preeclampsia. This condition can be dangerous for you and your baby. · Drink plenty of fluids, enough so that your urine is light yellow or clear like water. Dehydration can cause contractions. If you have kidney, heart, or liver disease and have to limit fluids, talk with your doctor before you increase the amount of fluids you drink. · Tell your doctor right away if you notice any symptoms of an infection, such as:  ? Burning when you urinate. ? A foul-smelling discharge from your vagina. ? Vaginal itching. ? Unexplained fever. ? Unusual pain or soreness in your uterus or lower belly. · Eat a balanced diet. Include plenty of foods that are high in calcium and iron. ? Foods high in calcium include milk, cheese, yogurt, almonds, and broccoli. ? Foods high in iron include red meat, shellfish, poultry, eggs, beans, raisins, whole-grain bread, and leafy green vegetables. · Do not smoke. If you need help quitting, talk to your doctor about stop-smoking programs and medicines. These can increase your chances of quitting for good. · Do not drink alcohol or use illegal drugs. · Follow your doctor's directions about activity. Your doctor will let you know how much, if any, exercise you can do. · Ask your doctor if you can have sex. If you are at risk for early labor, your doctor may ask you to not have sex. · Take care to prevent falls. During pregnancy, your joints are loose, and your balance is off. Sports such as bicycling, skiing, or in-line skating can increase your risk of falling. And don't ride horses or motorcycles, dive, water ski, scuba dive, or parachute jump while you are pregnant. · Avoid getting very hot. Do not use saunas or hot tubs. Avoid staying out in the sun in hot weather for long periods.  Take acetaminophen (Tylenol) to lower a high fever. · Do not take any over-the-counter or herbal medicines or supplements without talking to your doctor or pharmacist first.  When should you call for help? Call 911 anytime you think you may need emergency care. For example, call if:    · You passed out (lost consciousness).     · You have a seizure.     · You have severe vaginal bleeding.     · You have severe pain in your belly or pelvis.     · You have had fluid gushing or leaking from your vagina and you know or think the umbilical cord is bulging into your vagina. If this happens, immediately get down on your knees so your rear end (buttocks) is higher than your head. This will decrease the pressure on the cord until help arrives.   [facility] your doctor now or seek immediate medical care if:    · You have signs of preeclampsia, such as:  ? Sudden swelling of your face, hands, or feet. ? New vision problems (such as dimness, blurring, or seeing spots). ? A severe headache.     · You have any vaginal bleeding.     · You have belly pain or cramping.     · You have a fever.     · You have had regular contractions (with or without pain) for an hour. This means that you have 8 or more within 1 hour or 4 or more in 20 minutes after you change your position and drink fluids.     · You have a sudden release of fluid from your vagina.     · You have low back pain or pelvic pressure that does not go away.     · You notice that your baby has stopped moving or is moving much less than normal.    Watch closely for changes in your health, and be sure to contact your doctor if you have any problems. Where can you learn more? Go to http://daquan-katia.info/. Enter [id number] in the search box to learn more about \"Pregnancy Precautions: Care Instructions. \"  Current as of: May 29, 2019  Content Version: 12.2  © 8512-2594 [employer].  Care instructions adapted under license by "[employer] (which disclaims liability or warranty for this information). If you have questions about a medical condition or this instruction, always ask your healthcare professional. [address] any warranty or liability for your use of this information. Counting Your Baby's Kicks: Care Instructions  Your Care Instructions    Counting your baby's kicks is one way your doctor can tell that your baby is healthy. Most women--especially in a first pregnancy--feel their baby move for the first time between 16 and 22 weeks. The movement may feel like flutters rather than kicks. Your baby may move more at certain times of the day. When you are active, you may notice less kicking than when you are resting. At your prenatal visits, your doctor will ask whether the baby is active. In your last trimester, your doctor may ask you to count the number of times you feel your baby move. Follow-up care is a key part of your treatment and safety. Be sure to make and go to all appointments, and call your doctor if you are having problems. It's also a good idea to know your test results and keep a list of the medicines you take. How do you count fetal kicks? · A common method of checking your baby's movement is to count the number of kicks or moves you feel in 1 hour. Ten movements (such as kicks, flutters, or rolls) in 1 hour are normal. Some doctors suggest that you count in the morning until you get to 10 movements. Then you can quit for that day and start again the next day. · Pick your baby's most active time of day to count. This may be any time from morning to evening. · If you do not feel 10 movements in an hour, your baby may be sleeping. Wait for the next hour and count again. When should you call for help?   Call your doctor now or seek immediate medical care if:    · You noticed that your baby has stopped moving or is moving much less than normal.    Watch closely for changes in your health, and be sure to contact your doctor if you have any problems. Where can you learn more? Go to http://daquan-katia.info/. Enter V995 in the search box to learn more about \"Counting Your Baby's Kicks: Care Instructions. \"  Current as of: May 29, 2019  Content Version: 12.2  © 4616-1401 [employer]. Care instructions adapted under license by [email or web address] (which disclaims liability or warranty for this information). If you have questions about a medical condition or this instruction, always ask your healthcare professional. [address] any warranty or liability for your use of this information. [place] Contractions: Care Instructions  Your Care Instructions    [place] contractions prepare your uterus for labor. Think of them as a \"warm-up\" exercise that your body does. You may begin to feel them between the 28th and 30th weeks of your pregnancy. But they start as early as the 20th week. [place] contractions usually occur more often during the ninth month. They may go away when you are active and return when you rest. These contractions are like mild contractions of true labor, but they occur less often. (You feel fewer than 8 in an hour.) They don't cause your cervix to open. It may be hard for you to tell the difference between [place] contractions and true labor, especially in your first pregnancy. Follow-up care is a key part of your treatment and safety. Be sure to make and go to all appointments, and call your doctor if you are having problems. It's also a good idea to know your test results and keep a list of the medicines you take. How can you care for yourself at home? · Try a warm bath to help relieve muscle tension and reduce pain. · Change positions every 30 minutes. Take breaks if you must sit for a long time. Get up and walk around.   · Drink plenty of water, enough so that your urine is light yellow or clear like water.  · Taking short walks may help you feel better. Your doctor needs to check any contractions that are getting stronger or closer together. Where can you learn more? Go to http://daquan-katia.info/. Enter [phone] in the search box to learn more about \"[place] Contractions: Care Instructions. \"  Current as of: May 29, 2019  Content Version: 12.2  © 0697-4466 [employer]. Care instructions adapted under license by [employer]â„¢ (which disclaims liability or warranty for this information). If you have questions about a medical condition or this instruction, always ask your healthcare professional. [address] any warranty or liability for your use of this information.

## 2020-03-05 LAB — GRBS, EXTERNAL: NEGATIVE

## 2020-03-12 ENCOUNTER — HOSPITAL ENCOUNTER (INPATIENT)
Age: 25
LOS: 1 days | Discharge: HOME | DRG: 560 | End: 2020-03-13
Attending: OBSTETRICS & GYNECOLOGY | Admitting: OBSTETRICS & GYNECOLOGY
Payer: MEDICAID

## 2020-03-12 LAB
ABO + RH BLD: NORMAL
BASOPHILS # BLD: 0 K/UL (ref 0–0.1)
BASOPHILS NFR BLD: 0 % (ref 0–2)
BLOOD GROUP ANTIBODIES SERPL: NORMAL
DIFFERENTIAL METHOD BLD: ABNORMAL
EOSINOPHIL # BLD: 0.1 K/UL (ref 0–0.4)
EOSINOPHIL NFR BLD: 1 % (ref 0–5)
ERYTHROCYTE [DISTWIDTH] IN BLOOD BY AUTOMATED COUNT: 14.1 % (ref 11.6–14.5)
HCT VFR BLD AUTO: 36.8 % (ref 35–45)
HGB BLD-MCNC: 12.3 G/DL (ref 12–16)
LYMPHOCYTES # BLD: 2 K/UL (ref 0.9–3.6)
LYMPHOCYTES NFR BLD: 18 % (ref 21–52)
MCH RBC QN AUTO: 30.1 PG (ref 24–34)
MCHC RBC AUTO-ENTMCNC: 33.4 G/DL (ref 31–37)
MCV RBC AUTO: 90.2 FL (ref 74–97)
MONOCYTES # BLD: 0.7 K/UL (ref 0.05–1.2)
MONOCYTES NFR BLD: 6 % (ref 3–10)
NEUTS SEG # BLD: 8 K/UL (ref 1.8–8)
NEUTS SEG NFR BLD: 75 % (ref 40–73)
PLATELET # BLD AUTO: 187 K/UL (ref 135–420)
PMV BLD AUTO: 11.6 FL (ref 9.2–11.8)
RBC # BLD AUTO: 4.08 M/UL (ref 4.2–5.3)
SPECIMEN EXP DATE BLD: NORMAL
WBC # BLD AUTO: 10.7 K/UL (ref 4.6–13.2)

## 2020-03-12 PROCEDURE — 86900 BLOOD TYPING SEROLOGIC ABO: CPT

## 2020-03-12 PROCEDURE — 75410000002 HC LABOR FEE PER 1 HR

## 2020-03-12 PROCEDURE — 74011250637 HC RX REV CODE- 250/637

## 2020-03-12 PROCEDURE — 85025 COMPLETE CBC W/AUTO DIFF WBC: CPT

## 2020-03-12 PROCEDURE — 65270000029 HC RM PRIVATE

## 2020-03-12 PROCEDURE — 74011250637 HC RX REV CODE- 250/637: Performed by: ADVANCED PRACTICE MIDWIFE

## 2020-03-12 PROCEDURE — 75410000003 HC RECOV DEL/VAG/CSECN EA 0.5 HR

## 2020-03-12 PROCEDURE — 74011250636 HC RX REV CODE- 250/636: Performed by: ADVANCED PRACTICE MIDWIFE

## 2020-03-12 PROCEDURE — 75410000000 HC DELIVERY VAGINAL/SINGLE

## 2020-03-12 RX ORDER — IBUPROFEN 400 MG/1
800 TABLET ORAL EVERY 8 HOURS
Status: DISCONTINUED | OUTPATIENT
Start: 2020-03-12 | End: 2020-03-13 | Stop reason: HOSPADM

## 2020-03-12 RX ORDER — LIDOCAINE HYDROCHLORIDE 10 MG/ML
INJECTION, SOLUTION EPIDURAL; INFILTRATION; INTRACAUDAL; PERINEURAL
Status: DISPENSED
Start: 2020-03-12 | End: 2020-03-12

## 2020-03-12 RX ORDER — SODIUM CHLORIDE, SODIUM LACTATE, POTASSIUM CHLORIDE, CALCIUM CHLORIDE 600; 310; 30; 20 MG/100ML; MG/100ML; MG/100ML; MG/100ML
125 INJECTION, SOLUTION INTRAVENOUS CONTINUOUS
Status: DISCONTINUED | OUTPATIENT
Start: 2020-03-12 | End: 2020-03-12 | Stop reason: HOSPADM

## 2020-03-12 RX ORDER — METHYLERGONOVINE MALEATE 0.2 MG/ML
0.2 INJECTION INTRAVENOUS AS NEEDED
Status: DISCONTINUED | OUTPATIENT
Start: 2020-03-12 | End: 2020-03-12 | Stop reason: HOSPADM

## 2020-03-12 RX ORDER — BUTORPHANOL TARTRATE 2 MG/ML
2 INJECTION INTRAMUSCULAR; INTRAVENOUS
Status: DISCONTINUED | OUTPATIENT
Start: 2020-03-12 | End: 2020-03-12 | Stop reason: HOSPADM

## 2020-03-12 RX ORDER — TERBUTALINE SULFATE 1 MG/ML
0.25 INJECTION SUBCUTANEOUS
Status: DISCONTINUED | OUTPATIENT
Start: 2020-03-12 | End: 2020-03-12 | Stop reason: HOSPADM

## 2020-03-12 RX ORDER — MINERAL OIL
OIL (ML) ORAL
Status: COMPLETED
Start: 2020-03-12 | End: 2020-03-12

## 2020-03-12 RX ORDER — HYDROMORPHONE HYDROCHLORIDE 1 MG/ML
1 INJECTION, SOLUTION INTRAMUSCULAR; INTRAVENOUS; SUBCUTANEOUS
Status: DISCONTINUED | OUTPATIENT
Start: 2020-03-12 | End: 2020-03-12 | Stop reason: HOSPADM

## 2020-03-12 RX ORDER — OXYTOCIN/0.9 % SODIUM CHLORIDE 20/1000 ML
999 PLASTIC BAG, INJECTION (ML) INTRAVENOUS ONCE
Status: COMPLETED | OUTPATIENT
Start: 2020-03-12 | End: 2020-03-12

## 2020-03-12 RX ORDER — ACETAMINOPHEN 325 MG/1
650 TABLET ORAL
Status: DISCONTINUED | OUTPATIENT
Start: 2020-03-12 | End: 2020-03-13 | Stop reason: HOSPADM

## 2020-03-12 RX ORDER — OXYTOCIN/0.9 % SODIUM CHLORIDE 20/1000 ML
125 PLASTIC BAG, INJECTION (ML) INTRAVENOUS CONTINUOUS
Status: DISCONTINUED | OUTPATIENT
Start: 2020-03-12 | End: 2020-03-12 | Stop reason: HOSPADM

## 2020-03-12 RX ORDER — MINERAL OIL
30 OIL (ML) ORAL AS NEEDED
Status: COMPLETED | OUTPATIENT
Start: 2020-03-12 | End: 2020-03-12

## 2020-03-12 RX ORDER — LIDOCAINE HYDROCHLORIDE 10 MG/ML
20 INJECTION, SOLUTION EPIDURAL; INFILTRATION; INTRACAUDAL; PERINEURAL AS NEEDED
Status: DISCONTINUED | OUTPATIENT
Start: 2020-03-12 | End: 2020-03-12 | Stop reason: HOSPADM

## 2020-03-12 RX ORDER — ONDANSETRON 2 MG/ML
4 INJECTION INTRAMUSCULAR; INTRAVENOUS
Status: DISCONTINUED | OUTPATIENT
Start: 2020-03-12 | End: 2020-03-12 | Stop reason: HOSPADM

## 2020-03-12 RX ORDER — NALBUPHINE HYDROCHLORIDE 10 MG/ML
10 INJECTION, SOLUTION INTRAMUSCULAR; INTRAVENOUS; SUBCUTANEOUS
Status: DISCONTINUED | OUTPATIENT
Start: 2020-03-12 | End: 2020-03-12 | Stop reason: HOSPADM

## 2020-03-12 RX ADMIN — MINERAL 30 ML: 999 OIL ORAL at 02:00

## 2020-03-12 RX ADMIN — Medication 30 ML: at 02:00

## 2020-03-12 RX ADMIN — ACETAMINOPHEN 650 MG: 325 TABLET ORAL at 08:40

## 2020-03-12 RX ADMIN — IBUPROFEN 800 MG: 400 TABLET, FILM COATED ORAL at 04:01

## 2020-03-12 RX ADMIN — Medication 19980 MILLI-UNITS/HR: at 03:10

## 2020-03-12 RX ADMIN — IBUPROFEN 800 MG: 400 TABLET, FILM COATED ORAL at 18:13

## 2020-03-12 NOTE — LACTATION NOTE
Pediatrician in with patient. 1430 Infant latched and nursing well. Mom educated on breastfeeding basics--hunger cues, feeding on demand, waking baby if baby sleeps too long between feeds, importance of skin to skin, positioning and latching, risk of pacifier use and supplemental feedings, and importance of rooming in--and use of log sheet. Mom also educated on benefits of breastfeeding for herself and baby. Mom verbalized understanding. No questions at this time.

## 2020-03-12 NOTE — PROGRESS NOTES
1645  TRANSFER - IN REPORT:    Verbal report received from MOLLY Dowling RN (name) on [place]  being received from Labor and Delivery (unit) for routine progression of care      Report consisted of patients Situation, Background, Assessment and   Recommendations(SBAR). Information from the following report(s) SBAR, Kardex, Intake/Output, MAR and Recent Results was reviewed with the receiving nurse. Opportunity for questions and clarification was provided. Assessment completed upon patients arrival to unit and care assumed. 1750  Assessment complete, no further needs at this time. 1815  Gave scheduled motrin, no further needs at this time. 1905  Bedside and Verbal shift change report given to TRUE Fry RN (oncoming nurse) by JODIE Kahn RN (offgoing nurse). Report included the following information SBAR, Kardex, Intake/Output, MAR and Recent Results.

## 2020-03-12 NOTE — PROGRESS NOTES
0715 Bedside and Verbal shift change report given to MOLLY Dowling RN (oncoming nurse) by JASPER Lsia RN (offgoing nurse). Report included the following information SBAR, Kardex, Intake/Output, MAR and Recent Results    1200 Patient states she is still in pain but does not want any medication for pain including motrin and tylenol    1400 Patient states she does not want any medication for pain    1600 Patient still does not want any pain medication     1645 TRANSFER - OUT REPORT:    Verbal report given to Jessie CHRISTIANSON(name) on Anshu Saravia  being transferred to [facility](unit) for routine progression of care       Report consisted of patients Situation, Background, Assessment and   Recommendations(SBAR). Information from the following report(s) SBAR, Kardex, Intake/Output, MAR and Recent Results was reviewed with the receiving nurse. Lines:   Peripheral IV 03/12/20 Distal;Posterior;Right Forearm (Active)   Site Assessment Clean, dry, & intact 3/12/2020  5:50 PM   Phlebitis Assessment 0 3/12/2020  5:50 PM   Infiltration Assessment 0 3/12/2020  5:50 PM   Dressing Status Clean, dry, & intact 3/12/2020  5:50 PM   Dressing Type Tape;Transparent 3/12/2020  5:50 PM   Hub Color/Line Status Pink;Capped 3/12/2020  5:50 PM   Alcohol Cap Used Yes 3/12/2020  5:50 PM        Opportunity for questions and clarification was provided.       Patient transported with:   Registered Nurse

## 2020-03-12 NOTE — PROGRESS NOTES
1905- Bedside and Verbal shift change report given to TRUE Fry RN (oncoming nurse) by JODIE Kahn RN (offgoing nurse). Report included the following information SBAR, Kardex, Intake/Output, MAR and Recent Results.

## 2020-03-12 NOTE — LACTATION NOTE
This note was copied from a baby's chart.  per RN, mom requesting RUDDY. Per mom, \"he isn't latching and my nipples are really flat\". Mom concerned about  sleepiness. Discussed normal sleepy NB DOL1. Encouraged mom to call [facility] for next feeding before giving RUDDY. Mom verbalized understanding.  Per RN, mom requesting RUDDY again. LC went to discuss and offer a NS and education. Mom stated she just wants to give a RUDDY. Notified RN.

## 2020-03-12 NOTE — PROGRESS NOTES
Progress Note      Patient: Anshu Saravia               Sex: female          DOA: 3/12/2020         YOB: 1995      Age:  21 y.o.        LOS:  LOS: 0 days               Subjective:     No cc    Objective:      Visit Vitals  /71   Pulse 88   Temp 97.6 °F (36.4 °C)   Resp 18   Ht 5' 4.5\" (1.638 m)   Wt 84.4 kg (186 lb)   Breastfeeding Unknown   BMI 31.43 kg/m²       Physical Exam:  Pertinent items are noted in HPI. Intake and Output:  Current Shift:  No intake/output data recorded. Last three shifts:  No intake/output data recorded. Lab/Data Reviewed: All lab results for the last 24 hours reviewed.     Medications Reviewed    Continued hospitalization is indicated due to pp day 1/2      Assessment/Plan     Principal Problem:    IUP (intrauterine pregnancy), incidental (1/21/2016)        wnl pp check    Home dc likely tomorrow

## 2020-03-12 NOTE — PROGRESS NOTES
Labor Progress Note    Patient seen, fetal heart rate and contraction pattern evaluated. Physical Exam:  Pelvic: Cervix 8,   Effaced: 90%  Station:  -1     Artificial Rupture of Membranes; Amniotic Fluid: copius amount of clear fluid  Contractions: Every 2-4 minutes, severe  Fetal Heart Rate: Reactive    Assessment:  Active phase labor. and Satisfactory labor progress.     PLAN:  Reassuring fetal status, Labor  Progressing normally, Continue plan for vaginal delivery    Digna Chaudhari CNM  3/12/2020  2:04 AM

## 2020-03-12 NOTE — PROGRESS NOTES
Problem: Patient Education: Go to Patient Education Activity  Goal: Patient/Family Education  Outcome: Progressing Towards Goal     Problem: Vaginal Delivery: Day of Deliver-Laboring  Goal: Off Pathway (Use only if patient is Off Pathway)  Outcome: Progressing Towards Goal  Goal: Activity/Safety  Outcome: Progressing Towards Goal  Goal: Consults, if ordered  Outcome: Progressing Towards Goal  Goal: Diagnostic Test/Procedures  Outcome: Progressing Towards Goal  Goal: Nutrition/Diet  Outcome: Progressing Towards Goal  Goal: Discharge Planning  Outcome: Progressing Towards Goal  Goal: Medications  Outcome: Progressing Towards Goal  Goal: Respiratory  Outcome: Progressing Towards Goal  Goal: Treatments/Interventions/Procedures  Outcome: Progressing Towards Goal  Goal: *Vital signs within defined limits  Outcome: Progressing Towards Goal  Goal: *Labs within defined limits  Outcome: Progressing Towards Goal  Goal: *Hemodynamically stable  Outcome: Progressing Towards Goal  Goal: *Optimal pain control at patient's stated goal  Outcome: Progressing Towards Goal     Problem: Pain  Goal: *Control of Pain  Outcome: Progressing Towards Goal     Problem: Patient Education: Go to Patient Education Activity  Goal: Patient/Family Education  Outcome: Progressing Towards Goal

## 2020-03-12 NOTE — PROGRESS NOTES
[address] pt call light. Pt stated infant had spit-up episode. Infant lungs clear, good coloring, infant in no apparent distress or discomfort. 2005- Assessment performed without complications. VSS. Pt in no apparent distress or discomfort. Pt asked for a bottle to feed infant. Current pt plan is exclusively breastfeeding. Educated pt on benefits of exclusive breastfeeding and provided lanolin for sore nipples. Contacted lactation consultant to speak with pt. .    2020- Per lactation consultant, pt wants to give bottle. Bottles and nipples provided to pt. Pt's IV site taped so pt may shower. No other needs expressed at this time. 2140- Rounded on pt, laying comfortably in bed. No needs expressed at this time. Bed in lowest position, call bell in reach. 2350- Rounded on pt, laying comfortably in bed with infant. No needs expressed at this time. Bed in lowest position, call bell in reach. 0120- Rounded on pt, laying comfortably in bed. Pt in no apparent distress or discomfort. 0225- Rounded on pt, laying comfortably in bed on cell phone. No needs expressed at this time. Bed in lowest position, call bell in reach. 0335- Rounded on pt, laying comfortably in bed. Pt rates pain 5/10. Motrin given without complications. No other needs expressed at this time. 8197- Rounded on pt, laying comfortably in bed. No needs expressed at this time. Bed in lowest position, call bell in reach. 0630- Rounded on pt, laying comfortably in bed holding infant. Pt requested infant pacifier. Pt in no apparent distress or discomfort.

## 2020-03-12 NOTE — H&P
Ostetrical History and Physical    Subjective:     Date of Admission: 3/12/2020    Patient is a 21 y.o.   female admitted with spontaneous labor at 37+2. 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 She is GBS negative. For Obstetric history, see prenantal.    For Review of Systems, see prenatal    No past medical history on file. No past surgical history on file. Prior to Admission medications    Medication Sig Start Date End Date Taking? Authorizing Provider   prenatal multivit-ca-min-fe-fa tab Take 1 Tab by mouth daily. Indications: PREGNANCY   Yes Provider, Historical   ibuprofen (MOTRIN) 800 mg tablet Take 1 Tab by mouth every eight (8) hours as needed. 11/3/17   Chantal Fernandez CNM     No Known Allergies   Social History     Tobacco Use    Smoking status: Never Smoker    Smokeless tobacco: Never Used   Substance Use Topics    Alcohol use: No      No family history on file. Objective:     Blood pressure 106/68, pulse (!) 117, temperature 97.6 °F (36.4 °C), resp. rate 18, height 5' 4.5\" (1.638 m), weight 84.4 kg (186 lb), not currently breastfeeding. Temp (24hrs), Av.6 °F (36.4 °C), Min:97.6 °F (36.4 °C), Max:97.6 °F (36.4 °C)    No intake/output data recorded. No intake/output data recorded. HEENT: No pallor, no jaundice, Thyroid and throat normal  RESPIRATORY: Clear to A & P  CVS: pulse reg, HS normal  ABDOMEN: Gravid. Vertex. EFW=7lb +-1lb. No abnormal tenderness. Pelvic: Cervix 7-8,   Effaced: 100%  Station:  -2  Data Review:   No results found for this or any previous visit (from the past 24 hour(s)). Monitor:  Reactivity:present Variability:present Baseline:within normal limits    Assessment:     Active Problems:    Pregnancy (2020)      Plan:     Check labs:  CBC, T&S    Disposition:   Type of admit:In-Patient    I saw and examined patient.     Signed By: Roxann Valdez CNM                         2020

## 2020-03-12 NOTE — PROGRESS NOTES
Bedside and Verbal shift change report given to ENMANUEL Dowling RN (oncoming nurse) by Radha Patel (offgoing nurse). Report included the following information SBAR, Kardex, Intake/Output, MAR, Recent Results.

## 2020-03-12 NOTE — PROGRESS NOTES
0121:   37w2d arrived to unit complaining of contractions. Pt taken to triage 1 collected urine and hooked to EFM. Pt states pain level 10/10. Pt plans for natural delivery. GBS negative. Pt denies leaking fluid but state she started spotting at 2300. SVE -:  Spoke with Vero Cardoso, CNM   Order to admit pt    0158:  Vero Cardoso, CNM at bedside AROM clear fluid     0238:  Complete and pushing    0242:  Viable Baby Boy delilvered    0252:  Placenta delivered, no tears     0500:  Pt assisted to RR. Pt was able to urinate, danni care provided, bed linins and pad changed.

## 2020-03-12 NOTE — PROGRESS NOTES
Problem: Patient Education: Go to Patient Education Activity  Goal: Patient/Family Education  Outcome: Progressing Towards Goal     Problem: Pain  Goal: *Control of Pain  Outcome: Progressing Towards Goal     Problem: Patient Education: Go to Patient Education Activity  Goal: Patient/Family Education  Outcome: Progressing Towards Goal     Problem: Vaginal Delivery: Day of Delivery-Post delivery  Goal: Activity/Safety  Outcome: Progressing Towards Goal  Goal: Consults, if ordered  Outcome: Progressing Towards Goal  Goal: Nutrition/Diet  Outcome: Progressing Towards Goal  Goal: Discharge Planning  Outcome: Progressing Towards Goal  Goal: Medications  Outcome: Progressing Towards Goal  Goal: Treatments/Interventions/Procedures  Outcome: Progressing Towards Goal  Goal: *Vital signs within defined limits  Outcome: Progressing Towards Goal  Goal: *Labs within defined limits  Outcome: Progressing Towards Goal  Goal: *Hemodynamically stable  Outcome: Progressing Towards Goal  Goal: *Optimal pain control at patient's stated goal  Outcome: Progressing Towards Goal  Goal: *Participates in infant care  Outcome: Progressing Towards Goal  Goal: *Demonstrates progressive activity  Outcome: Progressing Towards Goal  Goal: *Tolerating diet  Outcome: Progressing Towards Goal

## 2020-03-12 NOTE — PROGRESS NOTES
Vaginal Delivery Procedure Note    Name: Joby Jeffers   Medical Record Number: [id number]   YOB: 1995  Today's Date: 2020    Procedure: VAGINAL DELIVERY     Anesthesia: no       Type - 1% xylocaine local:no  Epidural: no  Extra Procedure Details:       Estimated Blood Loss: 250 ccs    Fetal Description:  male     Anterior shoulder: left    Episiotomy: no   Tear: no  Degree: N/A degree              Cord Blood Results:   Information for the patient's :  Payam Lo [[id number]]   No components found for: ABG      Apgars: 8/9    Birth Information:   Information for the patient's :  Payam Lo [[id number]]      Placenta: delivered spontaneously at 0252, appears intact, 3 vessel cord    Specimens: Placenta was not sent           Complications:  Left Shoulder cord     Birth Weight: pending, infant skin to skin    Mother's Condition: good  Baby's Condition: stable    I was present for the delivery.     Signed: Yuan Reaves CNM      2020

## 2020-03-13 VITALS
OXYGEN SATURATION: 99 % | BODY MASS INDEX: 30.99 KG/M2 | TEMPERATURE: 97.7 F | DIASTOLIC BLOOD PRESSURE: 72 MMHG | HEIGHT: 65 IN | SYSTOLIC BLOOD PRESSURE: 118 MMHG | WEIGHT: 186 LBS | HEART RATE: 89 BPM | RESPIRATION RATE: 17 BRPM

## 2020-03-13 LAB
HCT VFR BLD AUTO: 28.9 % (ref 35–45)
HGB BLD-MCNC: 9.5 G/DL (ref 12–16)

## 2020-03-13 PROCEDURE — 74011250636 HC RX REV CODE- 250/636: Performed by: OBSTETRICS & GYNECOLOGY

## 2020-03-13 PROCEDURE — 85014 HEMATOCRIT: CPT

## 2020-03-13 PROCEDURE — 36415 COLL VENOUS BLD VENIPUNCTURE: CPT

## 2020-03-13 PROCEDURE — 90715 TDAP VACCINE 7 YRS/> IM: CPT | Performed by: OBSTETRICS & GYNECOLOGY

## 2020-03-13 PROCEDURE — 85018 HEMOGLOBIN: CPT

## 2020-03-13 PROCEDURE — 74011250637 HC RX REV CODE- 250/637: Performed by: ADVANCED PRACTICE MIDWIFE

## 2020-03-13 RX ORDER — PROMETHAZINE HYDROCHLORIDE 25 MG/ML
25 INJECTION, SOLUTION INTRAMUSCULAR; INTRAVENOUS
Status: DISCONTINUED | OUTPATIENT
Start: 2020-03-13 | End: 2020-03-13 | Stop reason: HOSPADM

## 2020-03-13 RX ORDER — ZOLPIDEM TARTRATE 5 MG/1
5 TABLET ORAL
Status: DISCONTINUED | OUTPATIENT
Start: 2020-03-13 | End: 2020-03-13 | Stop reason: HOSPADM

## 2020-03-13 RX ORDER — AMOXICILLIN 250 MG
1 CAPSULE ORAL
Status: DISCONTINUED | OUTPATIENT
Start: 2020-03-13 | End: 2020-03-13 | Stop reason: HOSPADM

## 2020-03-13 RX ADMIN — IBUPROFEN 800 MG: 400 TABLET, FILM COATED ORAL at 12:24

## 2020-03-13 RX ADMIN — IBUPROFEN 800 MG: 400 TABLET, FILM COATED ORAL at 03:33

## 2020-03-13 RX ADMIN — TETANUS TOXOID, REDUCED DIPHTHERIA TOXOID AND ACELLULAR PERTUSSIS VACCINE, ADSORBED 0.5 ML: 5; 2.5; 8; 8; 2.5 SUSPENSION INTRAMUSCULAR at 15:46

## 2020-03-13 NOTE — PROGRESS NOTES
D/C teaching completed with verbal understanding voiced by pt.  Pt given opportunity for questions and denies comments/concerns/questions at this time

## 2020-03-13 NOTE — PROGRESS NOTES
[address] [facility] office called to notify Dr Rich Méndez of critical serum bilirubin of 10.0 at 35 hours of life.  Results given to Guillaume Smith who will have Dr Rich Méndez call me back at [phone]

## 2020-03-13 NOTE — DISCHARGE INSTRUCTIONS
Vaginal Childbirth: After Your Visit  Your Care Instructions  Your body will slowly heal in the next few weeks. It is easy to get too tired and overwhelmed during the first weeks after your baby is born. Changes in your hormones can shift your mood without warning. You may find it hard to meet the extra demands on your energy and time. Take it easy on yourself. Follow-up care is a key part of your treatment and safety. Be sure to make and go to all appointments, and call your doctor if you are having problems. It's also a good idea to know your test results and keep a list of the medicines you take. How can you care for yourself at home? · Vaginal bleeding and cramps  ¨ After delivery, you will have a bloody discharge from the vagina. This will turn pink within a week and then white or yellow after about 10 days. It may last for 2 to 4 weeks or longer, until the uterus has healed. Use pads instead of tampons until you stop bleeding. ¨ Do not worry if you pass some blood clots, as long as they are smaller than a golf ball. If you have a tear or stitches in your vaginal area, change the pad at least every 4 hours to prevent soreness and infection. ¨ You may have cramps for the first few days after childbirth. These are normal and occur as the uterus shrinks to normal size. Take an over-the-counter pain medicine, such as acetaminophen (Tylenol), ibuprofen (Advil, Motrin), or naproxen (Aleve), for cramps. Read and follow all instructions on the label. Do not take aspirin, because it can cause more bleeding. ¨ Do not take two or more pain medicines at the same time unless the doctor told you to. Many pain medicines have acetaminophen, which is Tylenol. Too much acetaminophen (Tylenol) can be harmful. · Stitches  ¨ If you have stitches, they will dissolve on their own and do not need to be removed. Follow your doctor's instructions for cleaning the stitched area.   ¨ Put ice or a cold pack on your painful area for 10 to 20 minutes at a time, several times a day, for the first few days. Put a thin cloth between the ice and your skin. ¨ Sit in a few inches of warm water (sitz bath) 3 times a day and after bowel movements. The warm water helps with pain and itching. If you do not have a tub, a warm shower might help. · Breast fullness  ¨ Your breasts may overfill (engorge) in the first few days after delivery. To help milk flow and to relieve pain, warm your breasts in the shower or by using warm, moist towels before nursing. ¨ If you are not nursing, do not put warmth on your breasts or touch your breasts. Wear a tight bra or sports bra and use ice until the fullness goes away. This usually takes 2 to 3 days. ¨ Put ice or a cold pack on your breast after nursing to reduce swelling and pain. Put a thin cloth between the ice and your skin. · Activity  ¨ Eat a balanced diet. Do not try to lose weight by cutting calories. Keep taking your prenatal vitamins, or take a multivitamin. ¨ Get as much rest as you can. Try to take naps when your baby sleeps during the day. ¨ Get some exercise every day. But do not do any heavy exercise until your doctor says it is okay. ¨ Wait until you are healed (about 4 to 6 weeks) before you have sexual intercourse. Your doctor will tell you when it is okay to have sex. ¨ Talk to your doctor about birth control. You can get pregnant even before your period returns. Also, you can get pregnant while you are breast-feeding. · Mental health  ¨ It is normal to have some sadness, anxiety, sleeplessness, and mood swings after you go home. If you feel upset or hopeless for more than a few days or are having trouble doing the things you need to do, talk to your doctor. · Constipation and hemorrhoids  ¨ Drink plenty of fluids, enough so that your urine is light yellow or clear like water.  If you have kidney, heart, or liver disease and have to limit fluids, talk with your doctor before you increase the amount of fluids you drink. ¨ Eat plenty of fiber each day. Have a bran muffin or bran cereal for breakfast, and try eating a piece of fruit for a mid-afternoon snack. ¨ For painful, itchy hemorrhoids, put ice or a cold pack on the area several times a day for 10 minutes at a time. Follow this by putting a warm compress on the area for another 10 to 20 minutes or by sitting in a shallow, warm bath. When should you call for help? Call 911 anytime you think you may need emergency care. For example, call if:  · You are thinking of hurting yourself, your baby, or anyone else. · You have sudden, severe pain in your belly. · You passed out (lost consciousness). Call your doctor now or seek immediate medical care if:  · You have severe vaginal bleeding. · You are soaking through a pad each hour for 2 or more hours. · Your vaginal bleeding seems to be getting heavier or is still bright red 4 days after delivery. · You are dizzy or lightheaded, or you feel like you may faint. · You are vomiting or cannot keep fluids down. · You have a fever. · You have new or more belly pain. · You pass tissue (not just blood). · Your vaginal discharge smells bad. · Your belly feels tender or full and hard. · Your breasts are continuously painful or red. · You feel sad, anxious, or hopeless for more than a few days. Watch closely for changes in your health, and be sure to contact your doctor if you have any problems. Where can you learn more? Go to [employer]  Enter Q237 in the search box to learn more about \"Vaginal Childbirth: After Your Visit. \"   © 1573-3212 [employer]wise, Incorporated. Care instructions adapted under license by [employer] (which disclaims liability or warranty for this information).  This care instruction is for use with your licensed healthcare professional. If you have questions about a medical condition or this instruction, always ask your healthcare professional. Healthwise, Incorporated disclaims any warranty or liability for your use of this information. Content Version: 67.4.042399; Current as of: June 4, 2014          POST DELIVERY DISCHARGE INSTRUCTIONS    Name: Torin Ashley  YOB: 1995  Primary Diagnosis: Active Problems:    * No active hospital problems. *      General:     Diet/Diet Restrictions:  Eight 8-ounce glasses of fluid daily (water, juices); avoid excessive caffeine intake. Meals/snacks as desired which are high in fiber and carbohydrates and low in fat and cholesterol. Physical Activity / Restrictions / Safety:       Avoid intercourse 4-6 weeks, no douching or tampon use. Check with obstetrician before starting or resuming an exercise program.         Discharge Instructions/Special Treatment/Home Care Needs:     Continue prenatal vitamins. Continue to use squirt bottle with warm water on your episiotomy after each bathroom use until bleeding stops. Call your doctor for the following:     Fever over 100.4 degrees by mouth. Vaginal bleeding heavier than a normal menstrual period or clot larger than a golf ball. Red streaks or increased swelling of legs, painful red streaks on your breast.  Painful urination, constipation and increased pain or swelling or discharge with your incision. If you feel extremely anxious or overwhelmed. If you have thoughts of harming yourself and/or your baby. Pain Management:     Pain Management:   Take Acetaminophen (Tylenol) or Ibuprofen (Advil, Motrin), as directed for pain. Use a warm Sitz bath 3 times daily to relieve episiotomy or hemorrhoidal discomfort. For hemorrhoidal discomfort, use Tucks and Anusol cream as needed and directed. Follow-Up Care:     Call Dr Daysi Dumont office to make appointment for your 6 weeks follow up.       These are general instructions for a healthy lifestyle:    No smoking/ No tobacco products/ Avoid exposure to second hand smoke    Surgeon Brooke Finley Warning:  Quitting smoking now greatly reduces serious risk to your health. Obesity, smoking, and sedentary lifestyle greatly increases your risk for illness    A healthy diet, regular physical exercise & weight monitoring are important for maintaining a healthy lifestyle    Recognize signs and symptoms of STROKE:    F-face looks uneven    A-arms unable to move or move unevenly    S-speech slurred or non-existent    T-time-call 911 as soon as signs and symptoms begin-DO NOT go       Back to bed or wait to see if you get better-TIME IS BRAIN.     Patient armband removed and shredded

## 2020-03-13 NOTE — DISCHARGE SUMMARY
Vaginal Childbirth: After Your Visit  Your Care Instructions  Your body will slowly heal in the next few weeks. It is easy to get too tired and overwhelmed during the first weeks after your baby is born. Changes in your hormones can shift your mood without warning. You may find it hard to meet the extra demands on your energy and time. Take it easy on yourself. Follow-up care is a key part of your treatment and safety. Be sure to make and go to all appointments, and call your doctor if you are having problems. It's also a good idea to know your test results and keep a list of the medicines you take. How can you care for yourself at home? · Vaginal bleeding and cramps  ¨ After delivery, you will have a bloody discharge from the vagina. This will turn pink within a week and then white or yellow after about 10 days. It may last for 2 to 4 weeks or longer, until the uterus has healed. Use pads instead of tampons until you stop bleeding. ¨ Do not worry if you pass some blood clots, as long as they are smaller than a golf ball. If you have a tear or stitches in your vaginal area, change the pad at least every 4 hours to prevent soreness and infection. ¨ You may have cramps for the first few days after childbirth. These are normal and occur as the uterus shrinks to normal size. Take an over-the-counter pain medicine, such as acetaminophen (Tylenol), ibuprofen (Advil, Motrin), or naproxen (Aleve), for cramps. Read and follow all instructions on the label. Do not take aspirin, because it can cause more bleeding. ¨ Do not take two or more pain medicines at the same time unless the doctor told you to. Many pain medicines have acetaminophen, which is Tylenol. Too much acetaminophen (Tylenol) can be harmful. · Stitches  ¨ If you have stitches, they will dissolve on their own and do not need to be removed. Follow your doctor's instructions for cleaning the stitched area.   ¨ Put ice or a cold pack on your painful area for 10 to 20 minutes at a time, several times a day, for the first few days. Put a thin cloth between the ice and your skin. ¨ Sit in a few inches of warm water (sitz bath) 3 times a day and after bowel movements. The warm water helps with pain and itching. If you do not have a tub, a warm shower might help. · Breast fullness  ¨ Your breasts may overfill (engorge) in the first few days after delivery. To help milk flow and to relieve pain, warm your breasts in the shower or by using warm, moist towels before nursing. ¨ If you are not nursing, do not put warmth on your breasts or touch your breasts. Wear a tight bra or sports bra and use ice until the fullness goes away. This usually takes 2 to 3 days. ¨ Put ice or a cold pack on your breast after nursing to reduce swelling and pain. Put a thin cloth between the ice and your skin. · Activity  ¨ Eat a balanced diet. Do not try to lose weight by cutting calories. Keep taking your prenatal vitamins, or take a multivitamin. ¨ Get as much rest as you can. Try to take naps when your baby sleeps during the day. ¨ Get some exercise every day. But do not do any heavy exercise until your doctor says it is okay. ¨ Wait until you are healed (about 4 to 6 weeks) before you have sexual intercourse. Your doctor will tell you when it is okay to have sex. ¨ Talk to your doctor about birth control. You can get pregnant even before your period returns. Also, you can get pregnant while you are breast-feeding. · Mental health  ¨ It is normal to have some sadness, anxiety, sleeplessness, and mood swings after you go home. If you feel upset or hopeless for more than a few days or are having trouble doing the things you need to do, talk to your doctor. · Constipation and hemorrhoids  ¨ Drink plenty of fluids, enough so that your urine is light yellow or clear like water.  If you have kidney, heart, or liver disease and have to limit fluids, talk with your doctor before you increase the amount of fluids you drink. ¨ Eat plenty of fiber each day. Have a bran muffin or bran cereal for breakfast, and try eating a piece of fruit for a mid-afternoon snack. ¨ For painful, itchy hemorrhoids, put ice or a cold pack on the area several times a day for 10 minutes at a time. Follow this by putting a warm compress on the area for another 10 to 20 minutes or by sitting in a shallow, warm bath. When should you call for help? Call 911 anytime you think you may need emergency care. For example, call if:  · You are thinking of hurting yourself, your baby, or anyone else. · You have sudden, severe pain in your belly. · You passed out (lost consciousness). Call your doctor now or seek immediate medical care if:  · You have severe vaginal bleeding. · You are soaking through a pad each hour for 2 or more hours. · Your vaginal bleeding seems to be getting heavier or is still bright red 4 days after delivery. · You are dizzy or lightheaded, or you feel like you may faint. · You are vomiting or cannot keep fluids down. · You have a fever. · You have new or more belly pain. · You pass tissue (not just blood). · Your vaginal discharge smells bad. · Your belly feels tender or full and hard. · Your breasts are continuously painful or red. · You feel sad, anxious, or hopeless for more than a few days. Watch closely for changes in your health, and be sure to contact your doctor if you have any problems. Where can you learn more? Go to [employer]  Enter Q237 in the search box to learn more about \"Vaginal Childbirth: After Your Visit. \"   © 3564-7334 Healthwise, Incorporated. Care instructions adapted under license by [facility] [employer] (which disclaims liability or warranty for this information).  This care instruction is for use with your licensed healthcare professional. If you have questions about a medical condition or this instruction, always ask your healthcare professional. Healthwise, Incorporated disclaims any warranty or liability for your use of this information.   Content Version: 43.2.222566; Current as of: June 4, 2014            Discharge Summary    Patient: Roseann Boast               Sex: female          DOA: 3/12/2020         YOB: 1995      Age:  21 y.o.        LOS:  LOS: 1 day                Admit Date: 3/12/2020    Discharge Date: 3/13/2020    Admission Diagnoses: Pregnancy [Z34.90]    Discharge Diagnoses:    Problem List as of 3/13/2020 Date Reviewed: 3/13/2020          Codes Class Noted - Resolved    RESOLVED: Normal labor and delivery ICD-10-CM: O80  ICD-9-CM: 942  11/3/2017 - 3/13/2020        RESOLVED: Postpartum care and examination ICD-10-CM: Z39.2  ICD-9-CM: V24.2  11/3/2017 - 3/13/2020        RESOLVED: Labor and delivery, indication for care ICD-10-CM: O75.9  ICD-9-CM: 659.90  11/3/2017 - 3/13/2020        * (Principal) RESOLVED: IUP (intrauterine pregnancy), incidental ICD-10-CM: Z34.90  ICD-9-CM: V22.2  1/21/2016 - 3/13/2020        RESOLVED: Labor and delivery indication for care or intervention ICD-10-CM: O75.9  ICD-9-CM: 659.90  1/21/2016 - 1/21/2016              Discharge Condition: Good    Hospital Course: b9    Consults: 0    Activity: See surgical instructions    Diet: Regular Diet    Wound Care: As directed    Follow-up: 6 wk pp check

## 2020-03-13 NOTE — PROGRESS NOTES
0725- Bedside and Verbal shift change report given to Jessy Leija RN (oncoming nurse) by Misty Young RN (offgoing nurse). Report included the following information SBAR, Intake/Output, MAR and Recent Results.

## 2020-03-13 NOTE — LACTATION NOTE
Per mom, wants to bottle feed until milk supply has come in. Supply and demand reviewed. Breastfeeding discharge teaching completed to include feeding on demand, foremilk and hindmilk importance, engorgement, mastitis, clogged ducts, pumping, breastmilk storage, and returning to work. Information given about unit and office phone numbers and encouraged mom to reach out if concerns arise, but that [facility] would be calling her in the next few days to follow up on breastfeeding. Mom verbalized understanding and no questions at this time.

## 2020-03-13 NOTE — PROGRESS NOTES
[id number] [facility] office called to notify of critical results of serum bilirubin of 10.0 at 35 hours old. Results given to Angelina Lemons and she will have Dr Salas Carroll call me back at [phone].

## 2020-03-13 NOTE — PROGRESS NOTES
Progress Note      Patient: Rio Juárez               Sex: female          DOA: 3/12/2020         YOB: 1995      Age:  21 y.o.        LOS:  LOS: 1 day               Subjective:     No cc    Objective:      Visit Vitals  /52 (BP 1 Location: Right arm, BP Patient Position: At rest)   Pulse 81   Temp 97.9 °F (36.6 °C)   Resp 17   Ht 5' 4.5\" (1.638 m)   Wt 84.4 kg (186 lb)   SpO2 99%   Breastfeeding Unknown   BMI 31.43 kg/m²       Physical Exam:  Pertinent items are noted in HPI. Intake and Output:  Current Shift:  No intake/output data recorded. Last three shifts:  No intake/output data recorded. Lab/Data Reviewed: All lab results for the last 24 hours reviewed. Medications Reviewed    Continued hospitalization is not indicated due to pp day 1 wnl      Assessment/Plan     Active Problems:    * No active hospital problems.  *      wnl pp day 1    Home today